# Patient Record
Sex: MALE | Race: BLACK OR AFRICAN AMERICAN | NOT HISPANIC OR LATINO | Employment: OTHER | URBAN - METROPOLITAN AREA
[De-identification: names, ages, dates, MRNs, and addresses within clinical notes are randomized per-mention and may not be internally consistent; named-entity substitution may affect disease eponyms.]

---

## 2019-12-05 RX ORDER — MULTIVIT WITH MINERALS/LUTEIN
250 TABLET ORAL DAILY
COMMUNITY

## 2019-12-05 RX ORDER — EZETIMIBE 10 MG/1
TABLET ORAL
COMMUNITY
Start: 2019-09-09

## 2019-12-05 RX ORDER — LATANOPROST 50 UG/ML
SOLUTION/ DROPS OPHTHALMIC
COMMUNITY
Start: 2018-07-11

## 2019-12-05 RX ORDER — VALSARTAN AND HYDROCHLOROTHIAZIDE 320; 25 MG/1; MG/1
TABLET, FILM COATED ORAL
COMMUNITY
Start: 2019-09-04

## 2019-12-05 RX ORDER — ASPIRIN 81 MG/1
81 TABLET, CHEWABLE ORAL DAILY
COMMUNITY

## 2019-12-05 RX ORDER — PROPRANOLOL HYDROCHLORIDE 40 MG/1
TABLET ORAL
COMMUNITY
Start: 2019-01-14

## 2019-12-05 RX ORDER — HYDRALAZINE HYDROCHLORIDE 100 MG/1
TABLET, FILM COATED ORAL
COMMUNITY
Start: 2019-06-10

## 2019-12-05 RX ORDER — INSULIN LISPRO 100 [IU]/ML
INJECTION, SOLUTION INTRAVENOUS; SUBCUTANEOUS
COMMUNITY
Start: 2019-07-15

## 2019-12-05 NOTE — PROGRESS NOTES
Subjective   Patient ID: Lemuel Borrego is a 74 y.o. male is here today as a self referral for 2nd opinion for intermittent mild to moderate neck and bilateral shoulder and arm pain.  He is also having N/T right hand/fingers and bilateral arm weakness. He denies problems with his balance and gait and no urinary incontinence.    e has had recent PT as ordered by his PCP that helped minimally.  He is following HEP.  He is in pain management with Dr Craft with [facility] and has had injections, but he does not have know what they were and they did not help.  He has a follow up appt soon.  He states that he was prescribed meds for pain,however, he cannot recall the name of the medication, but he seldom takes it.     Patient was seen by Dr Eddie Clemente 7.25.19 who recommended surgery - C2/3 laminectomy, C4, C5-6 laminoplasty with plating, however, patient wanted a 2nd opinion prior to proceeding.    The patient is here for my opinion. He has seen Dr. Clemente downtown. He has been cared for by Dr. Craft at [facility]. He is a retired . About a year ago he began having some neck pain and some bilateral interscapular pain and right radiating shoulder and arm pain. That has progressed. He has also had numbness in his thumb and balance problems. He is a diabetic and takes insulin. When I examine him he does not have any outright hyperreflexia or pathological reflexes, and his strength actually is fairly decent. We discussed his MRI which does show severe stenosis and myelomalacia. He is not overtly myelopathic. He is clearly radiculopathic. Blocks have been tried as has physical therapy. When he went to see Dr. Clemente he recommended doing a laminoplasty which I told the patient is not an unreasonable recommendation. What seems to bother him the most is actually the pain, particularly in the shoulder and arm on the right. I told him I thought it would be worthwhile to undergo a  trial of gabapentin to see if that helps his pain and if it does not then we can consider a laminoplasty. Again, I think he is mostly radiculopathic although the myelomalacia is concerning. Surgery itself would be for his radiculopathy and prevention of any future myelopathy. If it had to be done I would do it from C2 to C6 via a right-sided approach, but he is wiling to try gabapentin first and so will try it at 300 mg twice a day and have him come back in 2 months.       Neck Pain    The current episode started more than 1 month ago. The problem occurs constantly. The pain is present in the midline. The pain is at a severity of 5/10. The pain is moderate. Associated symptoms include numbness and weakness.   Arm Pain    The pain is present in the left shoulder, upper right arm, upper left arm, right shoulder, left forearm and right forearm. Associated symptoms include numbness.   Extremity Weakness    The pain is present in the left arm and right arm. The problem occurs intermittently. The pain is at a severity of 6/10. The pain is moderate. Associated symptoms include numbness.       The following portions of the patient's history were reviewed and updated as appropriate: allergies, current medications, past family history, past medical history, past social history, past surgical history and problem list.    Review of Systems   Musculoskeletal: Positive for extremity weakness, neck pain and neck stiffness. Negative for arthralgias, gait problem and myalgias.   Neurological: Positive for weakness and numbness.   All other systems reviewed and are negative.      Objective   Physical Exam   Constitutional: He is oriented to person, place, and time. He appears well-developed and well-nourished.   HENT:   Head: Normocephalic and atraumatic.   Eyes: Pupils are equal, round, and reactive to light. Conjunctivae and EOM are normal.   Fundoscopic exam:       The right eye shows no papilledema. The right eye shows venous  pulsations.        The left eye shows no papilledema. The left eye shows venous pulsations.   Neck: Carotid bruit is not present.   Neurological: He is oriented to person, place, and time. He has a normal Finger-Nose-Finger Test and a normal Heel to Shin Test. Gait normal.   Reflex Scores:       Tricep reflexes are 2+ on the right side and 2+ on the left side.       Bicep reflexes are 2+ on the right side and 2+ on the left side.       Brachioradialis reflexes are 2+ on the right side and 2+ on the left side.       Patellar reflexes are 2+ on the right side and 2+ on the left side.       Achilles reflexes are 2+ on the right side and 2+ on the left side.  Psychiatric: His speech is normal.     Neurologic Exam     Mental Status   Oriented to person, place, and time.   Registration of memory: Good recent and remote memory.   Attention: normal. Concentration: normal.   Speech: speech is normal   Level of consciousness: alert  Knowledge: consistent with education.     Cranial Nerves     CN II   Visual fields full to confrontation.   Visual acuity: normal    CN III, IV, VI   Pupils are equal, round, and reactive to light.  Extraocular motions are normal.     CN V   Facial sensation intact.   Right corneal reflex: normal  Left corneal reflex: normal    CN VII   Facial expression full, symmetric.   Right facial weakness: none  Left facial weakness: none    CN VIII   Hearing: intact    CN IX, X   Palate: symmetric    CN XI   Right sternocleidomastoid strength: normal  Left sternocleidomastoid strength: normal    CN XII   Tongue: not atrophic  Tongue deviation: none    Motor Exam   Muscle bulk: normal  Right arm tone: normal  Left arm tone: normal  Right leg tone: normal  Left leg tone: normal    Strength   Strength 5/5 except as noted.     Sensory Exam   Light touch normal.     Gait, Coordination, and Reflexes     Gait  Gait: normal    Coordination   Finger to nose coordination: normal  Heel to shin coordination:  normal    Reflexes   Right brachioradialis: 2+  Left brachioradialis: 2+  Right biceps: 2+  Left biceps: 2+  Right triceps: 2+  Left triceps: 2+  Right patellar: 2+  Left patellar: 2+  Right achilles: 2+  Left achilles: 2+  Right : 2+  Left : 2+      Assessment/Plan   Independent Review of Radiographic Studies:    I reviewed his cervical MRI done on 6/7/2019 which shows cervical stenosis from C2-C6 due to both facet disease and disc protrusions with some myelomalacia at C2-C3 C3-C4 and C4-C5.  Agree with the report.      Medical Decision Making:    It may come to surgery but I suggested that we try some gabapentin first at 300 mg twice daily.  While that will not help his balance or the numbness in his thumb and might help his pain in his arm and perhaps even the neck.  We will have him come back in 2 months.  If he is not much better than we can consider doing a laminoplasty from C2-C6 using a right-sided approach, the side of his most significant radicular pain.      Lemuel was seen today for neck pain, shoulder pain, arm pain, extremity weakness and numbness.    Diagnoses and all orders for this visit:    Cervical spinal stenosis  -     gabapentin (NEURONTIN) 300 MG capsule; Take 1 capsule po qhs x 1 week, then bid    Chronic neck pain  -     gabapentin (NEURONTIN) 300 MG capsule; Take 1 capsule po qhs x 1 week, then bid    Cervical radiculopathy  -     gabapentin (NEURONTIN) 300 MG capsule; Take 1 capsule po qhs x 1 week, then bid      Return in about 2 months (around 2/11/2020).

## 2019-12-11 ENCOUNTER — OFFICE VISIT (OUTPATIENT)
Dept: NEUROSURGERY | Facility: CLINIC | Age: 74
End: 2019-12-11

## 2019-12-11 VITALS
SYSTOLIC BLOOD PRESSURE: 138 MMHG | HEIGHT: 69 IN | DIASTOLIC BLOOD PRESSURE: 78 MMHG | WEIGHT: 214 LBS | HEART RATE: 74 BPM | BODY MASS INDEX: 31.7 KG/M2

## 2019-12-11 DIAGNOSIS — G89.29 CHRONIC NECK PAIN: ICD-10-CM

## 2019-12-11 DIAGNOSIS — M54.2 CHRONIC NECK PAIN: ICD-10-CM

## 2019-12-11 DIAGNOSIS — M48.02 CERVICAL SPINAL STENOSIS: Primary | ICD-10-CM

## 2019-12-11 DIAGNOSIS — M54.12 CERVICAL RADICULOPATHY: ICD-10-CM

## 2019-12-11 PROCEDURE — 99204 OFFICE O/P NEW MOD 45 MIN: CPT | Performed by: NEUROLOGICAL SURGERY

## 2019-12-11 RX ORDER — GABAPENTIN 300 MG/1
CAPSULE ORAL
Qty: 60 CAPSULE | Refills: 3 | Status: SHIPPED | OUTPATIENT
Start: 2019-12-11 | End: 2020-04-15

## 2019-12-12 ENCOUNTER — TELEPHONE (OUTPATIENT)
Dept: NEUROSURGERY | Facility: CLINIC | Age: 74
End: 2019-12-12

## 2019-12-12 NOTE — TELEPHONE ENCOUNTER
Spoke with patient's insurance company, [place] approved- case # [id number] , good from 12.11.19 to 12.20.2020    LM for patient letting him know and per insurance rep, his pharmacy informed electronically that it has been approved

## 2020-03-04 ENCOUNTER — OFFICE VISIT (OUTPATIENT)
Dept: NEUROSURGERY | Facility: CLINIC | Age: 75
End: 2020-03-04

## 2020-03-04 VITALS
HEIGHT: 69 IN | SYSTOLIC BLOOD PRESSURE: 134 MMHG | HEART RATE: 74 BPM | DIASTOLIC BLOOD PRESSURE: 79 MMHG | BODY MASS INDEX: 31.59 KG/M2

## 2020-03-04 DIAGNOSIS — M48.02 CERVICAL SPINAL STENOSIS: Primary | ICD-10-CM

## 2020-03-04 DIAGNOSIS — M54.12 CERVICAL RADICULOPATHY: ICD-10-CM

## 2020-03-04 DIAGNOSIS — M54.2 CHRONIC NECK PAIN: ICD-10-CM

## 2020-03-04 DIAGNOSIS — G89.29 CHRONIC NECK PAIN: ICD-10-CM

## 2020-03-04 PROCEDURE — 99214 OFFICE O/P EST MOD 30 MIN: CPT | Performed by: NEUROLOGICAL SURGERY

## 2020-04-14 DIAGNOSIS — M48.02 CERVICAL SPINAL STENOSIS: ICD-10-CM

## 2020-04-14 DIAGNOSIS — G89.29 CHRONIC NECK PAIN: ICD-10-CM

## 2020-04-14 DIAGNOSIS — M54.12 CERVICAL RADICULOPATHY: ICD-10-CM

## 2020-04-14 DIAGNOSIS — M54.2 CHRONIC NECK PAIN: ICD-10-CM

## 2020-04-15 RX ORDER — GABAPENTIN 300 MG/1
CAPSULE ORAL
Qty: 60 CAPSULE | Refills: 3 | Status: SHIPPED | OUTPATIENT
Start: 2020-04-15 | End: 2020-08-24

## 2020-07-02 ENCOUNTER — TELEPHONE (OUTPATIENT)
Dept: NEUROSURGERY | Facility: CLINIC | Age: 75
End: 2020-07-02

## 2020-07-24 ENCOUNTER — TELEPHONE (OUTPATIENT)
Dept: NEUROSURGERY | Facility: CLINIC | Age: 75
End: 2020-07-24

## 2020-07-24 NOTE — TELEPHONE ENCOUNTER
PATIENT CALLED TO SCHEDULE A TELEPHONE VISIT TO DISCUSS HIS SHOULDER.  HE HAS HAD 4 SHOTS AND IS DOING WELL BUT WOULD LIKE TO KNOW WHAT IS GOING ON WITH HIS SHOULDER.  PLEASE CALL AND ADVISE.  IF PATIENT DOES NOT ANSWER PLEASE A MESSAGE AS TO THE DATE/TIME.  PLEASE CALL BOTH PHONES AND LEAVE A MESSAGE.    [phone] HOME  [phone] CELL

## 2020-07-28 NOTE — TELEPHONE ENCOUNTER
He was made an appt for Friday in error, is this something that you would see him for or just leave him on the schedule

## 2020-07-30 NOTE — PROGRESS NOTES
Subjective   History of Present Illness: Lemuel Borrego is a 74 y.o. male for follow up for bilateral  shoulder pain and intermittent neck and bilateral arm pain, bilateral arm weakness and right hand N/T.  He denies urinary incontinence or problems with his balance and gait.  He states that he has not seen an orthopedic surgeon for his shoulder pain.  He is taking Gabapentin 300 mg BID and he taking Norco 5/325 prn as prescribed by pain management    Patient states his PCP sent him to PT for his shoulder pain and the physical therapist recommended he follow up here    He is seeing Dr Craft for pain management and had an injection earlier this month that has helped some.  He is unsure of the type of injection    Patient was last seen 3.4.20 for neck and arm pain and weakness    I have been following this patient for cervical stenosis.  He has some neck pain and intermittent arm pain, but it is manageable.  His pain physician, Dr. Craft, gives him injections and it does seem to help.  He went to the physical therapist who told him that he may have a shoulder problem.  I agree with that.  He had some significant pain to palpation of the left shoulder and to external rotation, uncharacteristic of cervical problems.  He does not recall ever seeing an orthopedic surgeon, so we will send him to the orthopedic doctors along with an x-ray.  I will see him in 6 months just to follow him.  If he shows some signs of myelopathy then he might need to have surgery, but we are not at that point right now.      Neck Pain    The problem occurs constantly. The pain is associated with an MVA. The pain is at a severity of 4/10. The pain is moderate. Associated symptoms include numbness and weakness. Pertinent negatives include no fever.   Arm Pain    The pain is present in the left shoulder, right shoulder, upper right arm, upper left arm and left forearm. The pain is at a severity of 5/10. The pain is moderate. Associated  "symptoms include numbness.   Extremity Weakness    The pain is present in the left arm and right arm. The problem occurs constantly. Associated symptoms include numbness. Pertinent negatives include no fever.       The following portions of the patient's history were reviewed and updated as appropriate: allergies, current medications, past family history, past medical history, past social history, past surgical history and problem list.    Review of Systems   Constitutional: Negative for fever.   HENT: Negative.    Eyes: Negative.    Respiratory: Negative.    Cardiovascular: Negative.    Gastrointestinal: Negative.    Endocrine: Negative.    Genitourinary: Negative for urgency.   Musculoskeletal: Positive for extremity weakness, neck pain and neck stiffness. Negative for arthralgias, gait problem, joint swelling and myalgias.   Allergic/Immunologic: Negative.    Neurological: Positive for weakness and numbness.   Hematological: Negative.    Psychiatric/Behavioral: Negative.        Objective     Vitals:    07/31/20 1300   BP: 158/74   Pulse: 74   Temp: 98.2 °F (36.8 °C)   TempSrc: Tympanic   Height: 175.3 cm (69.02\")     Body mass index is 31.59 kg/m².      Physical Exam   Constitutional: He is oriented to person, place, and time. He appears well-developed and well-nourished.   HENT:   Head: Normocephalic and atraumatic.   Eyes: Pupils are equal, round, and reactive to light. Conjunctivae and EOM are normal.   Fundoscopic exam:       The right eye shows no papilledema. The right eye shows venous pulsations.        The left eye shows no papilledema. The left eye shows venous pulsations.   Neck: Carotid bruit is not present.   Neurological: He is oriented to person, place, and time. He has a normal Finger-Nose-Finger Test and a normal Heel to Shin Test. Gait normal.   Reflex Scores:       Tricep reflexes are 2+ on the right side and 2+ on the left side.       Bicep reflexes are 2+ on the right side and 2+ on the left " side.       Brachioradialis reflexes are 2+ on the right side and 2+ on the left side.       Patellar reflexes are 2+ on the right side and 2+ on the left side.       Achilles reflexes are 2+ on the right side and 2+ on the left side.  Psychiatric: His speech is normal.     Neurologic Exam     Mental Status   Oriented to person, place, and time.   Registration of memory: Good recent and remote memory.   Attention: normal. Concentration: normal.   Speech: speech is normal   Level of consciousness: alert  Knowledge: consistent with education.     Cranial Nerves     CN II   Visual fields full to confrontation.   Visual acuity: normal    CN III, IV, VI   Pupils are equal, round, and reactive to light.  Extraocular motions are normal.     CN V   Facial sensation intact.   Right corneal reflex: normal  Left corneal reflex: normal    CN VII   Facial expression full, symmetric.   Right facial weakness: none  Left facial weakness: none    CN VIII   Hearing: intact    CN IX, X   Palate: symmetric    CN XI   Right sternocleidomastoid strength: normal  Left sternocleidomastoid strength: normal    CN XII   Tongue: not atrophic  Tongue deviation: none    Motor Exam   Muscle bulk: normal  Right arm tone: normal  Left arm tone: normal  Right leg tone: normal  Left leg tone: normal    Strength   Strength 5/5 except as noted.     Sensory Exam   Light touch normal.     Gait, Coordination, and Reflexes     Gait  Gait: normal    Coordination   Finger to nose coordination: normal  Heel to shin coordination: normal    Reflexes   Right brachioradialis: 2+  Left brachioradialis: 2+  Right biceps: 2+  Left biceps: 2+  Right triceps: 2+  Left triceps: 2+  Right patellar: 2+  Left patellar: 2+  Right achilles: 2+  Left achilles: 2+  Right : 2+  Left : 2+          Assessment/Plan   Independent Review of Radiographic Studies:      I personally reviewed the images from the following studies.    I reviewed his cervical MRI done on 6/7/2019  which shows cervical stenosis from C2-C6 due to both facet disease and disc protrusions with some myelomalacia at C2-C3 C3-C4 and C4-C5.  Agree with the report.    Medical Decision Making:      His main area of pain is the left shoulder.  We can just watch him still for the cervical stenosis.  We will send him to orthopedics after getting an x-ray to have them evaluate him.  We will see him in 6-month so I can watch his gait.      Lemuel was seen today for shoulder pain, neck pain, arm pain, extremity weakness and numbness.    Diagnoses and all orders for this visit:    Cervical spinal stenosis    Chronic neck pain    Chronic pain in left shoulder  -     XR shoulder 2+ vw left; Future  -     Ambulatory Referral to Orthopedic Surgery      Return in about 6 months (around 1/31/2021) for Face-to-face.

## 2020-07-31 ENCOUNTER — OFFICE VISIT (OUTPATIENT)
Dept: NEUROSURGERY | Facility: CLINIC | Age: 75
End: 2020-07-31

## 2020-07-31 VITALS
HEIGHT: 69 IN | TEMPERATURE: 98.2 F | HEART RATE: 74 BPM | SYSTOLIC BLOOD PRESSURE: 158 MMHG | BODY MASS INDEX: 31.59 KG/M2 | DIASTOLIC BLOOD PRESSURE: 74 MMHG

## 2020-07-31 DIAGNOSIS — G89.29 CHRONIC NECK PAIN: ICD-10-CM

## 2020-07-31 DIAGNOSIS — M25.512 CHRONIC PAIN IN LEFT SHOULDER: ICD-10-CM

## 2020-07-31 DIAGNOSIS — M54.2 CHRONIC NECK PAIN: ICD-10-CM

## 2020-07-31 DIAGNOSIS — M48.02 CERVICAL SPINAL STENOSIS: Primary | ICD-10-CM

## 2020-07-31 DIAGNOSIS — G89.29 CHRONIC PAIN IN LEFT SHOULDER: ICD-10-CM

## 2020-07-31 PROCEDURE — 99214 OFFICE O/P EST MOD 30 MIN: CPT | Performed by: NEUROLOGICAL SURGERY

## 2020-07-31 RX ORDER — HYDROCODONE BITARTRATE AND ACETAMINOPHEN 5; 325 MG/1; MG/1
1 TABLET ORAL EVERY 6 HOURS PRN
COMMUNITY

## 2020-08-17 ENCOUNTER — OFFICE VISIT (OUTPATIENT)
Dept: ORTHOPEDIC SURGERY | Facility: CLINIC | Age: 75
End: 2020-08-17

## 2020-08-17 VITALS — BODY MASS INDEX: 31.1 KG/M2 | WEIGHT: 210 LBS | HEIGHT: 69 IN | TEMPERATURE: 98.2 F

## 2020-08-17 DIAGNOSIS — M25.512 LEFT SHOULDER PAIN, UNSPECIFIED CHRONICITY: Primary | ICD-10-CM

## 2020-08-17 PROCEDURE — 99204 OFFICE O/P NEW MOD 45 MIN: CPT | Performed by: ORTHOPAEDIC SURGERY

## 2020-08-17 PROCEDURE — 20610 DRAIN/INJ JOINT/BURSA W/O US: CPT | Performed by: ORTHOPAEDIC SURGERY

## 2020-08-17 PROCEDURE — 73030 X-RAY EXAM OF SHOULDER: CPT | Performed by: ORTHOPAEDIC SURGERY

## 2020-08-17 RX ORDER — METHYLPREDNISOLONE ACETATE 80 MG/ML
80 INJECTION, SUSPENSION INTRA-ARTICULAR; INTRALESIONAL; INTRAMUSCULAR; SOFT TISSUE
Status: COMPLETED | OUTPATIENT
Start: 2020-08-17 | End: 2020-08-17

## 2020-08-17 RX ADMIN — METHYLPREDNISOLONE ACETATE 80 MG: 80 INJECTION, SUSPENSION INTRA-ARTICULAR; INTRALESIONAL; INTRAMUSCULAR; SOFT TISSUE at 10:00

## 2020-08-17 NOTE — PROGRESS NOTES
Patient: Lemuel Borrego    YOB: 1945    Medical Record Number: [id number]    Chief Complaints:  Left shoulder pain    History of Present Illness:     75 y.o. male patient who presents with a complaint of left shoulder pain and weakness.  He reports that the symptoms first started several years ago but seem to have gotten particularly worse over the past few months.  Pain is moderate to severe, constant and aching.  He gets intermittent stabbing pains when trying to reach and lift overhead.  The pain is worse with certain reaching and lifting movements as well as at night.  He denies any alleviating factors.  He did try some therapy which did not seem to help.  He denies any shooting pain down the arm, distal weakness, numbness or paresthesias.    Allergies: No Known Allergies    Home Medications:    Current Outpatient Medications:   •  aspirin 81 MG chewable tablet, Chew 81 mg Daily., Disp: , Rfl:   •  ezetimibe (ZETIA) 10 MG tablet, TAKE 1 TABLET BY MOUTH DAILY, Disp: , Rfl:   •  gabapentin (NEURONTIN) 300 MG capsule, TAKE 1 CAPSULE BY MOUTH TWICE A DAY, Disp: 60 capsule, Rfl: 3  •  hydrALAZINE (APRESOLINE) 100 MG tablet, TAKE 1 TABLET BY MOUTH THREE TIMES DAILY, Disp: , Rfl:   •  HYDROcodone-acetaminophen (NORCO) 5-325 MG per tablet, Take 1 tablet by mouth Every 6 (Six) Hours As Needed., Disp: , Rfl:   •  Insulin Glargine (LANTUS SOLOSTAR) 100 UNIT/ML injection pen, INJECT 12 UNITS UNDER THE SKIN DAILY, Disp: , Rfl:   •  Insulin Lispro, 1 Unit Dial, (HUMALOG KWIKPEN) 100 UNIT/ML solution pen-injector, INJECT 6 UNITS WITH LUNCH AND 6 UNITS WITH DINNER., Disp: , Rfl:   •  Insulin Pen Needle (B-D UF III MINI PEN NEEDLES) 31G X 5 MM misc, INJECT 1 PEN NEEDLE FOR INJECTIONS FOUR TIMES A DAY, Disp: , Rfl:   •  latanoprost (XALATAN) 0.005 % ophthalmic solution, INSTILL ONE GTT IN OU QHS, Disp: , Rfl:   •  metFORMIN (GLUCOPHAGE) 850 MG tablet, TAKE 1 TABLET BY MOUTH TWO TIMES A DAY WITH MEALS, Disp: , Rfl:    •  propranolol (INDERAL) 40 MG tablet, TAKE 1 TABLET BY MOUTH THREE TIMES DAILY, Disp: , Rfl:   •  valsartan-hydrochlorothiazide (DIOVAN-HCT) 320-25 MG per tablet, TAKE 1 TABLET BY MOUTH DAILY, Disp: , Rfl:   •  vitamin C (ASCORBIC ACID) 250 MG tablet, Take 250 mg by mouth Daily., Disp: , Rfl:     Past Medical History:   Diagnosis Date   • Diabetes (CMS/HCC)    • Hypertelorism        No past surgical history on file.    Social History     Occupational History   • Not on file   Tobacco Use   • Smoking status: Never Smoker   • Smokeless tobacco: Never Used   Substance and Sexual Activity   • Alcohol use: Yes     Frequency: Monthly or less   • Drug use: No   • Sexual activity: Not on file      Social History     Social History Narrative   • Not on file       Family History   Problem Relation Age of Onset   • Diabetes Mother    • Heart disease Mother    • Heart disease Father    • Diabetes Sister    • Diabetes Brother        Review of Systems:      Constitutional: Denies fever, shaking or chills   Eyes: Denies change in visual acuity   HEENT: Denies nasal congestion or sore throat   Respiratory: Denies cough or shortness of breath   Cardiovascular: Denies chest pain or edema  Endocrine: Denies tremors, palpitations, intolerance of heat or cold, polyuria, polydipsia.  GI: Denies abdominal pain, nausea, vomiting, bloody stools or diarrhea  : Denies frequency, urgency, incontinence, retention, or nocturia.  Musculoskeletal: Denies numbness, tingling or loss of motor function except as above  Integument: Denies rash, lesion or ulceration   Neurologic: Denies headache or focal weakness, deficits  Heme: Denies spontaneous or excessive bleeding, epistaxis, hematuria, melena, fatigue, enlarged or tender lymph nodes.      All other pertinent positives and negatives as noted above in HPI.    Physical Exam:   75 y.o. male    Vitals:    08/17/20 0945   Temp: 98.2 °F (36.8 °C)   TempSrc: Oral   Weight: 95.3 kg (210 lb)   Height:  "175.3 cm (69\")     General:  Patient is awake and alert.  Appears in no acute distress or discomfort.    Psych:  Affect and demeanor are appropriate.    Eyes:  Conjunctiva and sclera appear grossly normal.  Eyes track well and EOM seem to be intact.    Ears:  No gross abnormalities.  Hearing adequate for the exam.    Cardiovascular:  Regular rate and rhythm.    Lungs:  Good chest expansion.  Breathing unlabored.    Lymph:  No palpable adenopathy about neck or axilla.    Neck:  Supple.  Somewhat limited ROM.  Negative Spurling's for shoulder or arm pain but does cause neck pain    Left upper extremity:  Skin is benign.  No gross abnormalities on inspection including any atrophy, swellings, or masses.  No palpable masses or adenopathy.  Mild to moderate anterior tenderness without effusion.  Full shoulder motion but he struggles with mid arc elevation.  No evident instability or apprehension.  Mildly positive Neer, [place]ona.  Weakness with forward elevation in the scapular plane.  Good strength with internal and external rotation.  Good strength in the deltoid, biceps, triceps, and .  Intact sensation throughout the arm.  Brisk cap refill.  Palpable radial pulse.  Good skin turgor.         Radiology:   AP, scapular Y, and axillary views of the left shoulder are ordered by myself and reviewed to evaluate the patient's complaint.  No comparison films are immediately available.  The x-rays show calcification of the coracoacromial ligament and a large type II acromion.  There are no obvious acute abnormalities, lesions, masses, significant degenerative changes, or other concerning findings.  The acromiohumeral interval is normal.  Glenoid version appears normal as well.    Assessment/Plan:   Left rotator cuff tear    I suspect he probably has a tear.  We discussed the natural history of rotator cuff tears and risk of potential tear progression.  We thoroughly discussed options in detail including a trial of " conservative treatment versus further work-up and potential surgical options. He has acknowledged understanding of this information.  The patient would prefer to pursue nonsurgical management at this time.  The risk, benefits and alternatives to an injection were thoroughly discussed, particularly with respect to his diabetes.  He consented and the injection was performed as described below.  He was not interested in going back to PT.  He will follow-up with me as needed going forward.  I told him to call me in 2 weeks if no better.    Bubba Woodard MD    08/17/2020    CC to Andre Rojas MD    Large Joint Arthrocentesis: L subacromial bursa  Date/Time: 8/17/2020 10:00 AM  Consent given by: patient  Site marked: site marked  Timeout: Immediately prior to procedure a time out was called to verify the correct patient, procedure, equipment, support staff and site/side marked as required   Supporting Documentation  Indications: pain and joint swelling   Procedure Details  Location: shoulder - L subacromial bursa  Preparation: Patient was prepped and draped in the usual sterile fashion  Needle gauge: 21g.  Approach: posterior  Medications administered: 80 mg methylPREDNISolone acetate 80 MG/ML; 2 mL lidocaine (cardiac)  Patient tolerance: patient tolerated the procedure well with no immediate complications

## 2020-08-21 DIAGNOSIS — M54.12 CERVICAL RADICULOPATHY: ICD-10-CM

## 2020-08-21 DIAGNOSIS — G89.29 CHRONIC NECK PAIN: ICD-10-CM

## 2020-08-21 DIAGNOSIS — M48.02 CERVICAL SPINAL STENOSIS: ICD-10-CM

## 2020-08-21 DIAGNOSIS — M54.2 CHRONIC NECK PAIN: ICD-10-CM

## 2020-08-24 RX ORDER — GABAPENTIN 300 MG/1
CAPSULE ORAL
Qty: 60 CAPSULE | Refills: 5 | Status: SHIPPED | OUTPATIENT
Start: 2020-08-24 | End: 2021-03-02

## 2021-01-28 ENCOUNTER — TELEPHONE (OUTPATIENT)
Dept: NEUROSURGERY | Facility: CLINIC | Age: 76
End: 2021-01-28

## 2021-01-28 NOTE — TELEPHONE ENCOUNTER
Called patient to verify x-ray had been performed prior to 2.1.21 appointment. Patient informed me he had not had an x-ray done. He stated that he had surgery on Tuesday, a lump removed from his back. He couldn't remember the physician that performed the surgery. I asked him about his left shoulder and he stated that his right shoulder was the painful shoulder. He had gotten an injection in his left shoulder and now his right shoulder was bothersome.   I discussed the x-ray with JANELL Castro and she approved of ordering a right shoulder x-ray.

## 2021-02-01 ENCOUNTER — HOSPITAL ENCOUNTER (OUTPATIENT)
Dept: GENERAL RADIOLOGY | Facility: HOSPITAL | Age: 76
Discharge: HOME | End: 2021-02-01
Admitting: NEUROLOGICAL SURGERY

## 2021-02-01 DIAGNOSIS — M25.511 PAIN IN JOINT OF RIGHT SHOULDER: ICD-10-CM

## 2021-02-01 PROCEDURE — 73030 X-RAY EXAM OF SHOULDER: CPT

## 2021-02-09 NOTE — PROGRESS NOTES
"Subjective   Patient ID: Lemuel Borrego is a 75 y.o. male is here today for follow-up. Patient was last seen in the office on 7/31/2020 with Dr. Odilon Beavers MD for neck, shoulder, and arm pain with extremity weakness and numbness. Patient was referred to orthopedic surgery and XR was ordered.     Patient had XR on 2/1/21.     Today, the patient reports he has neck, shoulder and arm pain with numbness and weakness. He has had some extremity swelling.    Patient, provider and MA are all wearing a mask in our office today.    I have been following this very nice man for radiographic cervical stenosis. He has come chronic neck pain and some numbness and tingling in his hands, but his balance and his strength actually are quite good. I did not feel there was a reason to operate on him last time. His gait still is stable. There has been no falling. He has a part-time business in lawn care. He used to work for [employer] for 30 years. I do not think there is any substantial clinical deterioration. I asked him to come and see me in 1 year.       History of Present Illness    The following portions of the patient's history were reviewed and updated as appropriate: allergies, current medications, past family history, past medical history, past social history, past surgical history and problem list.    Review of Systems   Constitutional: Negative for fever.   Respiratory: Negative for chest tightness and shortness of breath.    Cardiovascular: Positive for leg swelling.   Musculoskeletal: Positive for arthralgias, myalgias and neck pain.   Neurological: Positive for weakness and numbness.   All other systems reviewed and are negative.          Objective     Vitals:    02/22/21 1243   BP: 128/76   Pulse: 74   Temp: 97.5 °F (36.4 °C)   Weight: 95.3 kg (210 lb)   Height: 175.3 cm (69\")     Body mass index is 31.01 kg/m².      Physical Exam  Constitutional:       Appearance: He is well-developed.   HENT:      Head: Normocephalic " and atraumatic.   Eyes:      Extraocular Movements: EOM normal.      Conjunctiva/sclera: Conjunctivae normal.      Pupils: Pupils are equal, round, and reactive to light.   Neck:      Vascular: No carotid bruit.   Neurological:      Mental Status: He is oriented to person, place, and time.      Coordination: Finger-Nose-Finger Test and Heel to Shin Test normal.      Gait: Gait is intact.      Deep Tendon Reflexes:      Reflex Scores:       Tricep reflexes are 2+ on the right side and 2+ on the left side.       Bicep reflexes are 2+ on the right side and 2+ on the left side.       Brachioradialis reflexes are 2+ on the right side and 2+ on the left side.       Patellar reflexes are 2+ on the right side and 2+ on the left side.       Achilles reflexes are 2+ on the right side and 2+ on the left side.  Psychiatric:         Speech: Speech normal.       Neurologic Exam     Mental Status   Oriented to person, place, and time.   Registration of memory: Good recent and remote memory.   Attention: normal. Concentration: normal.   Speech: speech is normal   Level of consciousness: alert  Knowledge: consistent with education.     Cranial Nerves     CN II   Visual fields full to confrontation.   Visual acuity: normal    CN III, IV, VI   Pupils are equal, round, and reactive to light.  Extraocular motions are normal.     CN V   Facial sensation intact.   Right corneal reflex: normal  Left corneal reflex: normal    CN VII   Facial expression full, symmetric.   Right facial weakness: none  Left facial weakness: none    CN VIII   Hearing: intact    CN IX, X   Palate: symmetric    CN XI   Right sternocleidomastoid strength: normal  Left sternocleidomastoid strength: normal    CN XII   Tongue: not atrophic  Tongue deviation: none    Motor Exam   Muscle bulk: normal  Right arm tone: normal  Left arm tone: normal  Right leg tone: normal  Left leg tone: normal    Strength   Strength 5/5 except as noted.     Sensory Exam   Light touch  normal.     Gait, Coordination, and Reflexes     Gait  Gait: normal    Coordination   Finger to nose coordination: normal  Heel to shin coordination: normal    Reflexes   Right brachioradialis: 2+  Left brachioradialis: 2+  Right biceps: 2+  Left biceps: 2+  Right triceps: 2+  Left triceps: 2+  Right patellar: 2+  Left patellar: 2+  Right achilles: 2+  Left achilles: 2+  Right : 2+  Left : 2+          Assessment/Plan   Independent Review of Radiographic Studies:      I personally reviewed the images from the following studies.    I reviewed his cervical MRI done on 6/7/2019 which shows cervical stenosis from C2-C6 due to both facet disease and disc protrusions with some myelomalacia at C2-C3 C3-C4 and C4-C5.  Agree with the report.    Medical Decision Making:      Clinically stable at this point.  We will keep an eye on him but I asked him to come back and see me in 1 year.  If he has more pain in his neck or more weakness or balance problems he will let me know.      Diagnoses and all orders for this visit:    1. Cervical spinal stenosis (Primary)    2. Chronic neck pain      Return in about 1 year (around 2/22/2022) for Face-to-face.

## 2021-02-22 ENCOUNTER — OFFICE VISIT (OUTPATIENT)
Dept: NEUROSURGERY | Facility: CLINIC | Age: 76
End: 2021-02-22

## 2021-02-22 VITALS
DIASTOLIC BLOOD PRESSURE: 76 MMHG | SYSTOLIC BLOOD PRESSURE: 128 MMHG | TEMPERATURE: 97.5 F | HEIGHT: 69 IN | WEIGHT: 210 LBS | HEART RATE: 74 BPM | BODY MASS INDEX: 31.1 KG/M2

## 2021-02-22 DIAGNOSIS — M48.02 CERVICAL SPINAL STENOSIS: Primary | ICD-10-CM

## 2021-02-22 DIAGNOSIS — G89.29 CHRONIC NECK PAIN: ICD-10-CM

## 2021-02-22 DIAGNOSIS — M54.2 CHRONIC NECK PAIN: ICD-10-CM

## 2021-02-22 PROCEDURE — 99213 OFFICE O/P EST LOW 20 MIN: CPT | Performed by: NEUROLOGICAL SURGERY

## 2021-03-02 DIAGNOSIS — M54.2 CHRONIC NECK PAIN: ICD-10-CM

## 2021-03-02 DIAGNOSIS — G89.29 CHRONIC NECK PAIN: ICD-10-CM

## 2021-03-02 DIAGNOSIS — M48.02 CERVICAL SPINAL STENOSIS: ICD-10-CM

## 2021-03-02 DIAGNOSIS — M54.12 CERVICAL RADICULOPATHY: ICD-10-CM

## 2021-03-02 RX ORDER — GABAPENTIN 300 MG/1
CAPSULE ORAL
Qty: 60 CAPSULE | Refills: 5 | Status: SHIPPED | OUTPATIENT
Start: 2021-03-02 | End: 2021-10-06

## 2021-06-30 ENCOUNTER — OFFICE VISIT (OUTPATIENT)
Dept: ORTHOPEDIC SURGERY | Facility: CLINIC | Age: 76
End: 2021-06-30

## 2021-06-30 VITALS — WEIGHT: 210 LBS | HEIGHT: 69 IN | TEMPERATURE: 96.4 F | BODY MASS INDEX: 31.1 KG/M2

## 2021-06-30 DIAGNOSIS — M25.511 CHRONIC RIGHT SHOULDER PAIN: Primary | ICD-10-CM

## 2021-06-30 DIAGNOSIS — G89.29 CHRONIC RIGHT SHOULDER PAIN: Primary | ICD-10-CM

## 2021-06-30 PROCEDURE — 99214 OFFICE O/P EST MOD 30 MIN: CPT | Performed by: ORTHOPAEDIC SURGERY

## 2021-06-30 PROCEDURE — 20610 DRAIN/INJ JOINT/BURSA W/O US: CPT | Performed by: ORTHOPAEDIC SURGERY

## 2021-06-30 RX ORDER — METHYLPREDNISOLONE ACETATE 80 MG/ML
80 INJECTION, SUSPENSION INTRA-ARTICULAR; INTRALESIONAL; INTRAMUSCULAR; SOFT TISSUE
Status: COMPLETED | OUTPATIENT
Start: 2021-06-30 | End: 2021-06-30

## 2021-06-30 RX ORDER — LIDOCAINE HYDROCHLORIDE 20 MG/ML
2 INJECTION, SOLUTION EPIDURAL; INFILTRATION; INTRACAUDAL; PERINEURAL
Status: COMPLETED | OUTPATIENT
Start: 2021-06-30 | End: 2021-06-30

## 2021-06-30 RX ADMIN — METHYLPREDNISOLONE ACETATE 80 MG: 80 INJECTION, SUSPENSION INTRA-ARTICULAR; INTRALESIONAL; INTRAMUSCULAR; SOFT TISSUE at 11:48

## 2021-06-30 RX ADMIN — LIDOCAINE HYDROCHLORIDE 2 ML: 20 INJECTION, SOLUTION EPIDURAL; INFILTRATION; INTRACAUDAL; PERINEURAL at 11:48

## 2021-06-30 NOTE — PROGRESS NOTES
Patient:Lemuel Borrego    YOB: 1945    Medical Record Number:[id number]    Chief Complaints: Right shoulder pain    History of Present Illness:     75 y.o. male patient who presents for new complaint of right shoulder pain.  I saw him for the left shoulder about a year ago.  We did an injection and it helped tremendously.  He is now having similar symptoms on the right side.  This has been a gradually worsening issue for years, especially the last 6 months.  Denies any specific injury.  Pain is worse when reaching and lifting overhead.  He reports pain at night and difficulty sleeping.    Allergies:No Known Allergies    Home Medications:    Current Outpatient Medications:   •  aspirin 81 MG chewable tablet, Chew 81 mg Daily., Disp: , Rfl:   •  ezetimibe (ZETIA) 10 MG tablet, TAKE 1 TABLET BY MOUTH DAILY, Disp: , Rfl:   •  gabapentin (NEURONTIN) 300 MG capsule, TAKE 1 CAPSULE BY MOUTH TWICE DAILY, Disp: 60 capsule, Rfl: 5  •  hydrALAZINE (APRESOLINE) 100 MG tablet, TAKE 1 TABLET BY MOUTH THREE TIMES DAILY, Disp: , Rfl:   •  HYDROcodone-acetaminophen (NORCO) 5-325 MG per tablet, Take 1 tablet by mouth Every 6 (Six) Hours As Needed., Disp: , Rfl:   •  Insulin Glargine (LANTUS SOLOSTAR) 100 UNIT/ML injection pen, INJECT 12 UNITS UNDER THE SKIN DAILY, Disp: , Rfl:   •  Insulin Lispro, 1 Unit Dial, (HUMALOG KWIKPEN) 100 UNIT/ML solution pen-injector, INJECT 6 UNITS WITH LUNCH AND 6 UNITS WITH DINNER., Disp: , Rfl:   •  Insulin Pen Needle (B-D UF III MINI PEN NEEDLES) 31G X 5 MM misc, INJECT 1 PEN NEEDLE FOR INJECTIONS FOUR TIMES A DAY, Disp: , Rfl:   •  latanoprost (XALATAN) 0.005 % ophthalmic solution, INSTILL ONE GTT IN OU QHS, Disp: , Rfl:   •  metFORMIN (GLUCOPHAGE) 850 MG tablet, TAKE 1 TABLET BY MOUTH TWO TIMES A DAY WITH MEALS, Disp: , Rfl:   •  propranolol (INDERAL) 40 MG tablet, TAKE 1 TABLET BY MOUTH THREE TIMES DAILY, Disp: , Rfl:   •  valsartan-hydrochlorothiazide (DIOVAN-HCT) 320-25 MG per  "tablet, TAKE 1 TABLET BY MOUTH DAILY, Disp: , Rfl:   •  vitamin C (ASCORBIC ACID) 250 MG tablet, Take 250 mg by mouth Daily., Disp: , Rfl:     Past Medical History:   Diagnosis Date   • Diabetes (CMS/HCC)    • Hypertelorism        History reviewed. No pertinent surgical history.    Social History     Occupational History   • Not on file   Tobacco Use   • Smoking status: Never Smoker   • Smokeless tobacco: Never Used   Vaping Use   • Vaping Use: Never used   Substance and Sexual Activity   • Alcohol use: Yes   • Drug use: No   • Sexual activity: Defer      Social History     Social History Narrative   • Not on file       Family History   Problem Relation Age of Onset   • Diabetes Mother    • Heart disease Mother    • Heart disease Father    • Diabetes Sister    • Diabetes Brother        Review of Systems:      Constitutional: Denies fever, shaking or chills   Eyes: Denies change in visual acuity   HEENT: Denies nasal congestion or sore throat   Respiratory: Denies cough or shortness of breath   Cardiovascular: Denies chest pain or edema  Endocrine: Denies tremors, palpitations, intolerance of heat or cold, polyuria, polydipsia.  GI: Denies abdominal pain, nausea, vomiting, bloody stools or diarrhea  : Denies frequency, urgency, incontinence, retention, or nocturia.  Musculoskeletal: Denies numbness, tingling or loss of motor function except as above  Integument: Denies rash, lesion or ulceration   Neurologic: Denies headache or focal weakness, deficits  Heme: Denies spontaneous or excessive bleeding, epistaxis, hematuria, melena, fatigue, enlarged or tender lymph nodes.      All other pertinent positives and negatives as noted above in HPI.    Physical Exam:75 y.o. male  Vitals:    06/30/21 1124   Temp: 96.4 °F (35.8 °C)   Weight: 95.3 kg (210 lb)   Height: 175.3 cm (69.02\")       General:  Patient is awake and alert.  Appears in no acute distress or discomfort.    Psych:  Affect and demeanor are " appropriate.    Extremities: Right shoulder is examined.  Skin is benign.  Trace bursal effusion.  No increased warmth or erythema.  Full shoulder motion.  He has pain and weakness with elevation in the scapular plane and external rotation.    Imaging: Outside AP and rotated AP views of the right shoulder from January 2021 are reviewed along with the radiology report.  No acute abnormalities.  He has moderate acromioclavicular arthritis.  It looks like there may be a loose body or April calcification adjacent to the caudal aspect of the glenoid.  Acromiohumeral interval measures normal.    Assessment/Plan: Right shoulder pain and weakness, suspected rotator cuff tear    We discussed the natural history of this condition and his options.  He responded well to an injection on the left and he would like to try that for the right.  The risk, benefits and alternatives were discussed, particularly his elevated risk status due to medical comorbidities.  He acknowledged understanding of this information and consented to proceed.  The injection was performed as described below.    Bubba Wodoard MD    06/30/2021      Large Joint Arthrocentesis: R glenohumeral  Date/Time: 6/30/2021 11:48 AM  Consent given by: patient  Site marked: site marked  Timeout: Immediately prior to procedure a time out was called to verify the correct patient, procedure, equipment, support staff and site/side marked as required   Supporting Documentation  Indications: pain and joint swelling   Procedure Details  Location: shoulder - R glenohumeral  Preparation: Patient was prepped and draped in the usual sterile fashion  Needle gauge: 21g.  Approach: anterolateral  Medications administered: 80 mg methylPREDNISolone acetate 80 MG/ML; 2 mL lidocaine PF 2% 2 %  Patient tolerance: patient tolerated the procedure well with no immediate complications

## 2021-10-06 DIAGNOSIS — M54.2 CHRONIC NECK PAIN: ICD-10-CM

## 2021-10-06 DIAGNOSIS — G89.29 CHRONIC NECK PAIN: ICD-10-CM

## 2021-10-06 DIAGNOSIS — M54.12 CERVICAL RADICULOPATHY: ICD-10-CM

## 2021-10-06 DIAGNOSIS — M48.02 CERVICAL SPINAL STENOSIS: ICD-10-CM

## 2021-10-06 RX ORDER — GABAPENTIN 300 MG/1
CAPSULE ORAL
Qty: 60 CAPSULE | Refills: 5 | Status: SHIPPED | OUTPATIENT
Start: 2021-10-06 | End: 2022-05-02

## 2021-10-06 NOTE — TELEPHONE ENCOUNTER
Rx Refill Note  Requested Prescriptions     Pending Prescriptions Disp Refills   • gabapentin (NEURONTIN) 300 MG capsule [Pharmacy Med Name: GABAPENTIN 300MG CAPSULES] 60 capsule      Sig: TAKE 1 CAPSULE BY MOUTH TWICE DAILY      Last office visit with prescribing clinician: 2/22/2021      Next office visit with prescribing clinician: 2/21/2022            Marium Sneed MA  10/06/21, 16:35 EDT

## 2021-10-11 ENCOUNTER — CLINICAL SUPPORT (OUTPATIENT)
Dept: ORTHOPEDIC SURGERY | Facility: CLINIC | Age: 76
End: 2021-10-11

## 2021-10-11 VITALS — BODY MASS INDEX: 30.87 KG/M2 | TEMPERATURE: 97.4 F | HEIGHT: 69 IN | WEIGHT: 208.4 LBS

## 2021-10-11 DIAGNOSIS — M25.511 CHRONIC RIGHT SHOULDER PAIN: Primary | ICD-10-CM

## 2021-10-11 DIAGNOSIS — G89.29 CHRONIC RIGHT SHOULDER PAIN: Primary | ICD-10-CM

## 2021-10-11 PROCEDURE — 20610 DRAIN/INJ JOINT/BURSA W/O US: CPT | Performed by: ORTHOPAEDIC SURGERY

## 2021-10-11 RX ORDER — METHYLPREDNISOLONE ACETATE 80 MG/ML
80 INJECTION, SUSPENSION INTRA-ARTICULAR; INTRALESIONAL; INTRAMUSCULAR; SOFT TISSUE
Status: COMPLETED | OUTPATIENT
Start: 2021-10-11 | End: 2021-10-11

## 2021-10-11 RX ADMIN — METHYLPREDNISOLONE ACETATE 80 MG: 80 INJECTION, SUSPENSION INTRA-ARTICULAR; INTRALESIONAL; INTRAMUSCULAR; SOFT TISSUE at 10:33

## 2021-10-11 NOTE — PROGRESS NOTES
Mr. Borrego follows up today for the right shoulder.  He says that the injection we performed at his last visit did help.  Unfortunately, the effects were somewhat transient.  He is also now having neck problems for which he is scheduled to see Dr. Beavers in the near future.  We discussed possible further work-up of the shoulder.  He would prefer to try another injection today.  The risk, benefits and alternatives were discussed.  He consented and the injection was performed as described below.  He will follow-up as needed.      Large Joint Arthrocentesis: R glenohumeral  Date/Time: 10/11/2021 10:33 AM  Consent given by: patient  Site marked: site marked  Timeout: Immediately prior to procedure a time out was called to verify the correct patient, procedure, equipment, support staff and site/side marked as required   Supporting Documentation  Indications: pain   Procedure Details  Location: shoulder - R glenohumeral  Preparation: Patient was prepped and draped in the usual sterile fashion  Needle gauge: 21g.  Approach: posterior  Medications administered: 80 mg methylPREDNISolone acetate 80 MG/ML; 2 mL lidocaine (cardiac)  Patient tolerance: patient tolerated the procedure well with no immediate complications

## 2022-02-14 NOTE — PROGRESS NOTES
Subjective   Patient ID: Lemuel Borrego is a 76 y.o. male is here today for a 1Y cervical spinal stenosis follow-up.  Pt last seen on 2/22/21 and reports neck pain with weakness and numbness.    Today Mr. Borrego reports neck pain that is midline but radiates to the right side. He reports an increase in arm pain on his right side and states it has begun to move into his left arm. He reports numbness and tingling of his right hand, specifically at the thumb. He denies any sharp, shooting or stabbing pain that radiates down his arm. He continues to take Gabapentin 300 mg BID.     I have been following this patient for cervical stenosis from C3-C7.  Up until about 2 weeks ago we managed to control his symptoms with gabapentin at 300 mg twice daily but has had severe right-sided neck pain and right arm pain for about the last 2 weeks.  He cannot think of anything that set it off.  He is a diabetic.  I told him we could try a Medrol Dosepak and then get some new imaging.  We had a look at his neck since 2019.  Things may have changed or he may have a superimposed new process such as a herniated disc.  We can consider therapy if he is better or epidural blocks depending upon the findings when he returns.    I can detect no myelopathy today.  He does have good strength in his upper extremities but extension tends to reproduce some of his arm pain.        Neck Pain   This is a chronic problem. The current episode started more than 1 year ago. The problem occurs constantly. The problem has been gradually worsening. The pain is associated with nothing. The pain is present in the midline and right side. The quality of the pain is described as aching. The pain is at a severity of 5/10. The pain is mild. The symptoms are aggravated by bending, position and twisting. Associated symptoms include numbness and tingling. Pertinent negatives include no fever, leg pain or weakness. Treatments tried: gabapentin.       The following  portions of the patient's history were reviewed and updated as appropriate: allergies, current medications, past family history, past medical history, past social history, past surgical history and problem list.    Review of Systems   Constitutional: Negative for fever.   Musculoskeletal: Positive for neck pain.   Neurological: Positive for tingling and numbness. Negative for weakness.   Psychiatric/Behavioral: Positive for sleep disturbance.   All other systems reviewed and are negative.      Objective   Physical Exam  Constitutional:       Appearance: He is well-developed.   HENT:      Head: Normocephalic and atraumatic.   Eyes:      Extraocular Movements: EOM normal.      Conjunctiva/sclera: Conjunctivae normal.      Pupils: Pupils are equal, round, and reactive to light.   Neck:      Vascular: No carotid bruit.   Neurological:      Mental Status: He is oriented to person, place, and time.      Coordination: Finger-Nose-Finger Test and Heel to Shin Test normal.      Gait: Gait is intact.      Deep Tendon Reflexes:      Reflex Scores:       Tricep reflexes are 2+ on the right side and 2+ on the left side.       Bicep reflexes are 2+ on the right side and 2+ on the left side.       Brachioradialis reflexes are 2+ on the right side and 2+ on the left side.       Patellar reflexes are 2+ on the right side and 2+ on the left side.       Achilles reflexes are 2+ on the right side and 2+ on the left side.  Psychiatric:         Speech: Speech normal.       Neurologic Exam     Mental Status   Oriented to person, place, and time.   Registration of memory: Good recent and remote memory.   Attention: normal. Concentration: normal.   Speech: speech is normal   Level of consciousness: alert  Knowledge: consistent with education.     Cranial Nerves     CN II   Visual fields full to confrontation.   Visual acuity: normal    CN III, IV, VI   Pupils are equal, round, and reactive to light.  Extraocular motions are normal.     CN V    Facial sensation intact.   Right corneal reflex: normal  Left corneal reflex: normal    CN VII   Facial expression full, symmetric.   Right facial weakness: none  Left facial weakness: none    CN VIII   Hearing: intact    CN IX, X   Palate: symmetric    CN XI   Right sternocleidomastoid strength: normal  Left sternocleidomastoid strength: normal    CN XII   Tongue: not atrophic  Tongue deviation: none    Motor Exam   Muscle bulk: normal  Right arm tone: normal  Left arm tone: normal  Right leg tone: normal  Left leg tone: normal    Strength   Strength 5/5 except as noted.     Sensory Exam   Light touch normal.     Gait, Coordination, and Reflexes     Gait  Gait: normal    Coordination   Finger to nose coordination: normal  Heel to shin coordination: normal    Reflexes   Right brachioradialis: 2+  Left brachioradialis: 2+  Right biceps: 2+  Left biceps: 2+  Right triceps: 2+  Left triceps: 2+  Right patellar: 2+  Left patellar: 2+  Right achilles: 2+  Left achilles: 2+  Right : 2+  Left : 2+      Assessment/Plan   Independent Review of Radiographic Studies:      I reviewed his cervical MRI done on 6/7/2019 which shows cervical stenosis from C2-C6 due to both facet disease and disc protrusions with some myelomalacia at C2-C3 C3-C4 and C4-C5.  Agree with the report.    Medical Decision Making:      We will try Medrol Dosepak and reimage him with x-rays and MRI and have him come back to see me.  Hopefully we can manage him nonoperatively now that he is developed more significant symptoms.      Diagnoses and all orders for this visit:    1. Cervical spinal stenosis (Primary)  -     MRI Cervical Spine Without Contrast; Future  -     XR spine cervical ap and lat w flex and ext; Future    2. Chronic neck pain  -     MRI Cervical Spine Without Contrast; Future  -     XR spine cervical ap and lat w flex and ext; Future    3. Cervical radiculopathy  -     MRI Cervical Spine Without Contrast; Future  -     XR spine  cervical ap and lat w flex and ext; Future    Other orders  -     methylPREDNISolone (MEDROL) 4 MG dose pack; Take as directed on package instructions.  Dispense: 21 tablet; Refill: 0      Return in about 4 weeks (around 3/21/2022) for After MRI, face-to-face.

## 2022-02-21 ENCOUNTER — OFFICE VISIT (OUTPATIENT)
Dept: NEUROSURGERY | Facility: CLINIC | Age: 77
End: 2022-02-21

## 2022-02-21 VITALS
HEIGHT: 69 IN | HEART RATE: 82 BPM | BODY MASS INDEX: 30.96 KG/M2 | SYSTOLIC BLOOD PRESSURE: 140 MMHG | TEMPERATURE: 98 F | WEIGHT: 209 LBS | OXYGEN SATURATION: 100 % | DIASTOLIC BLOOD PRESSURE: 84 MMHG

## 2022-02-21 DIAGNOSIS — M48.02 CERVICAL SPINAL STENOSIS: Primary | ICD-10-CM

## 2022-02-21 DIAGNOSIS — M54.2 CHRONIC NECK PAIN: ICD-10-CM

## 2022-02-21 DIAGNOSIS — G89.29 CHRONIC NECK PAIN: ICD-10-CM

## 2022-02-21 DIAGNOSIS — M54.12 CERVICAL RADICULOPATHY: ICD-10-CM

## 2022-02-21 PROCEDURE — 99214 OFFICE O/P EST MOD 30 MIN: CPT | Performed by: NEUROLOGICAL SURGERY

## 2022-02-21 RX ORDER — METHYLPREDNISOLONE 4 MG/1
TABLET ORAL
Qty: 21 TABLET | Refills: 0 | Status: SHIPPED | OUTPATIENT
Start: 2022-02-21

## 2022-02-28 ENCOUNTER — HOSPITAL ENCOUNTER (OUTPATIENT)
Dept: GENERAL RADIOLOGY | Facility: HOSPITAL | Age: 77
Discharge: HOME | End: 2022-02-28
Admitting: NEUROLOGICAL SURGERY

## 2022-02-28 DIAGNOSIS — M48.02 CERVICAL SPINAL STENOSIS: ICD-10-CM

## 2022-02-28 DIAGNOSIS — M54.2 CHRONIC NECK PAIN: ICD-10-CM

## 2022-02-28 DIAGNOSIS — G89.29 CHRONIC NECK PAIN: ICD-10-CM

## 2022-02-28 DIAGNOSIS — M54.12 CERVICAL RADICULOPATHY: ICD-10-CM

## 2022-02-28 PROCEDURE — 72050 X-RAY EXAM NECK SPINE 4/5VWS: CPT

## 2022-03-16 ENCOUNTER — APPOINTMENT (OUTPATIENT)
Dept: OTHER | Facility: HOSPITAL | Age: 77
End: 2022-03-16

## 2022-03-16 ENCOUNTER — HOSPITAL ENCOUNTER (OUTPATIENT)
Dept: MRI IMAGING | Facility: HOSPITAL | Age: 77
Discharge: HOME | End: 2022-03-16

## 2022-03-16 DIAGNOSIS — Z09 FOLLOW UP: ICD-10-CM

## 2022-03-16 DIAGNOSIS — G89.29 CHRONIC NECK PAIN: ICD-10-CM

## 2022-03-16 DIAGNOSIS — M48.02 CERVICAL SPINAL STENOSIS: ICD-10-CM

## 2022-03-16 DIAGNOSIS — M54.2 CHRONIC NECK PAIN: ICD-10-CM

## 2022-03-16 DIAGNOSIS — M54.12 CERVICAL RADICULOPATHY: ICD-10-CM

## 2022-03-16 PROCEDURE — 72141 MRI NECK SPINE W/O DYE: CPT

## 2022-04-01 ENCOUNTER — APPOINTMENT (OUTPATIENT)
Dept: MRI IMAGING | Facility: HOSPITAL | Age: 77
End: 2022-04-01

## 2022-04-11 NOTE — PROGRESS NOTES
Subjective   Patient ID: Lemuel Borrego is a 76 y.o. male is here today for follow-up after MRI cervical/xray cervical done done 3/16/22.  Pt last seen on 2/21/22 and reported neck pain that radiates to R side and arm along with N/T in R hand.  Pt takes Gabapentin 300 mg bid.  Pt given a MDP at the last visit.    Today, Mr. Borrego reports constant neck pain that radiates into the right arm with weakness and tingling. He denies numbness. He reports that his hand is not as strong as it use to be    This gentleman has severe radiographic spinal stenosis and we have been following him for a while.  He began having more painful symptoms in the neck and the right arm and we got a new MRI.  He still has the same degree of stenosis from C2 down to C5 but nothing worse.  He remains radiculopathic but not myelopathic.  He has good strength but he has tenderness to palpation in his right shoulder and difficulty with pain with external rotation would suggest some superimposed shoulder arthritis.  We will get x-rays of the shoulder before the next visit.  I told him we should try and avoid surgery if at all possible.  Given that he is not myelopathic I do not see any mandatory reason for it.  We will try some physical therapy and perhaps blocks could be tried afterwards.  He is on aspirin.  He tells me has some triggering of his fourth and fifth finger on the right hand which is not bothersome.  I told him that is a separate issue.  If that gets worse we can send him to the hand doctors.        Neck Pain   The problem occurs constantly. The problem has been gradually worsening. The pain is present in the left side, midline and right side. The quality of the pain is described as aching. The symptoms are aggravated by position. Associated symptoms include tingling and weakness. Pertinent negatives include no chest pain, fever or numbness.       The following portions of the patient's history were reviewed and updated as  appropriate: allergies, current medications, past family history, past medical history, past social history, past surgical history and problem list.    Review of Systems   Constitutional: Negative for fever.   Respiratory: Negative for chest tightness and shortness of breath.    Cardiovascular: Negative for chest pain.   Musculoskeletal: Positive for neck pain.   Neurological: Positive for tingling and weakness. Negative for numbness.   All other systems reviewed and are negative.      Objective   Physical Exam  Constitutional:       Appearance: He is well-developed.   HENT:      Head: Normocephalic and atraumatic.   Eyes:      Extraocular Movements: EOM normal.      Conjunctiva/sclera: Conjunctivae normal.      Pupils: Pupils are equal, round, and reactive to light.   Neck:      Vascular: No carotid bruit.   Neurological:      Mental Status: He is oriented to person, place, and time.      Coordination: Finger-Nose-Finger Test and Heel to Shin Test normal.      Gait: Gait is intact.      Deep Tendon Reflexes:      Reflex Scores:       Tricep reflexes are 2+ on the right side and 2+ on the left side.       Bicep reflexes are 2+ on the right side and 2+ on the left side.       Brachioradialis reflexes are 2+ on the right side and 2+ on the left side.       Patellar reflexes are 2+ on the right side and 2+ on the left side.       Achilles reflexes are 2+ on the right side and 2+ on the left side.  Psychiatric:         Speech: Speech normal.       Neurologic Exam     Mental Status   Oriented to person, place, and time.   Registration of memory: Good recent and remote memory.   Attention: normal. Concentration: normal.   Speech: speech is normal   Level of consciousness: alert  Knowledge: consistent with education.     Cranial Nerves     CN II   Visual fields full to confrontation.   Visual acuity: normal    CN III, IV, VI   Pupils are equal, round, and reactive to light.  Extraocular motions are normal.     CN V    Facial sensation intact.   Right corneal reflex: normal  Left corneal reflex: normal    CN VII   Facial expression full, symmetric.   Right facial weakness: none  Left facial weakness: none    CN VIII   Hearing: intact    CN IX, X   Palate: symmetric    CN XI   Right sternocleidomastoid strength: normal  Left sternocleidomastoid strength: normal    CN XII   Tongue: not atrophic  Tongue deviation: none    Motor Exam   Muscle bulk: normal  Right arm tone: normal  Left arm tone: normal  Right leg tone: normal  Left leg tone: normal    Strength   Strength 5/5 except as noted.     Sensory Exam   Light touch normal.     Gait, Coordination, and Reflexes     Gait  Gait: normal    Coordination   Finger to nose coordination: normal  Heel to shin coordination: normal    Reflexes   Right brachioradialis: 2+  Left brachioradialis: 2+  Right biceps: 2+  Left biceps: 2+  Right triceps: 2+  Left triceps: 2+  Right patellar: 2+  Left patellar: 2+  Right achilles: 2+  Left achilles: 2+  Right : 2+  Left : 2+      Assessment/Plan   Independent Review of Radiographic Studies:      The cervical MRI done on 3/22/2022 shows radiographic severe stenosis at C2-C3 C3-C4 and C4-C5 but is not any different from the MRI done in 2019.  There is some myelomalacia at the same levels.  The x-rays done on 2/28/2022 do not show any instability but a lot of degenerative changes.  Agree with the report.      Medical Decision Making:      Again, he is radiculopathic but not myelopathic and he may have some superimposed shoulder problem so we will try some physical therapy and get a shoulder x-ray.  If that does not work we can try some injections but I would like to avoid surgery on him if at all possible.  We will see him in about 3 months.          Diagnoses and all orders for this visit:    1. Cervical spinal stenosis (Primary)  -     Ambulatory Referral to Physical Therapy Evaluate and treat    2. Cervical radiculopathy  -     Ambulatory  Referral to Physical Therapy Evaluate and treat    3. Chronic neck pain  -     Ambulatory Referral to Physical Therapy Evaluate and treat    4. Chronic right shoulder pain  -     XR shoulder 2+ vw right; Future      Return in about 3 months (around 7/20/2022) for Face-to-face.

## 2022-04-20 ENCOUNTER — OFFICE VISIT (OUTPATIENT)
Dept: NEUROSURGERY | Facility: CLINIC | Age: 77
End: 2022-04-20

## 2022-04-20 VITALS
SYSTOLIC BLOOD PRESSURE: 140 MMHG | DIASTOLIC BLOOD PRESSURE: 80 MMHG | OXYGEN SATURATION: 99 % | HEART RATE: 70 BPM | WEIGHT: 209 LBS | TEMPERATURE: 96.9 F | BODY MASS INDEX: 30.96 KG/M2 | HEIGHT: 69 IN

## 2022-04-20 DIAGNOSIS — M54.12 CERVICAL RADICULOPATHY: ICD-10-CM

## 2022-04-20 DIAGNOSIS — G89.29 CHRONIC NECK PAIN: ICD-10-CM

## 2022-04-20 DIAGNOSIS — M48.02 CERVICAL SPINAL STENOSIS: Primary | ICD-10-CM

## 2022-04-20 DIAGNOSIS — M25.511 CHRONIC RIGHT SHOULDER PAIN: ICD-10-CM

## 2022-04-20 DIAGNOSIS — M54.2 CHRONIC NECK PAIN: ICD-10-CM

## 2022-04-20 DIAGNOSIS — G89.29 CHRONIC RIGHT SHOULDER PAIN: ICD-10-CM

## 2022-04-20 PROCEDURE — 99214 OFFICE O/P EST MOD 30 MIN: CPT | Performed by: NEUROLOGICAL SURGERY

## 2022-05-02 ENCOUNTER — HOSPITAL ENCOUNTER (OUTPATIENT)
Dept: GENERAL RADIOLOGY | Facility: HOSPITAL | Age: 77
Discharge: HOME | End: 2022-05-02
Admitting: NEUROLOGICAL SURGERY

## 2022-05-02 DIAGNOSIS — M54.2 CHRONIC NECK PAIN: ICD-10-CM

## 2022-05-02 DIAGNOSIS — M48.02 CERVICAL SPINAL STENOSIS: ICD-10-CM

## 2022-05-02 DIAGNOSIS — G89.29 CHRONIC RIGHT SHOULDER PAIN: ICD-10-CM

## 2022-05-02 DIAGNOSIS — M25.511 CHRONIC RIGHT SHOULDER PAIN: ICD-10-CM

## 2022-05-02 DIAGNOSIS — G89.29 CHRONIC NECK PAIN: ICD-10-CM

## 2022-05-02 DIAGNOSIS — M54.12 CERVICAL RADICULOPATHY: ICD-10-CM

## 2022-05-02 PROCEDURE — 73030 X-RAY EXAM OF SHOULDER: CPT

## 2022-05-02 RX ORDER — GABAPENTIN 300 MG/1
CAPSULE ORAL
Qty: 60 CAPSULE | Refills: 5 | Status: SHIPPED | OUTPATIENT
Start: 2022-05-02 | End: 2022-11-21

## 2022-05-02 NOTE — TELEPHONE ENCOUNTER
Rx Refill Note  Requested Prescriptions     Pending Prescriptions Disp Refills   • gabapentin (NEURONTIN) 300 MG capsule [Pharmacy Med Name: GABAPENTIN 300MG CAPSULES] 60 capsule      Sig: TAKE 1 CAPSULE BY MOUTH TWICE DAILY      Last office visit with prescribing clinician: 4/20/2022      Next office visit with prescribing clinician: 7/20/2022            Kamryn Oliveros MA  05/02/22, 10:56 EDT

## 2022-06-06 ENCOUNTER — CLINICAL SUPPORT (OUTPATIENT)
Dept: ORTHOPEDIC SURGERY | Facility: CLINIC | Age: 77
End: 2022-06-06

## 2022-06-06 VITALS — WEIGHT: 200 LBS | HEIGHT: 69 IN | TEMPERATURE: 97.8 F | BODY MASS INDEX: 29.62 KG/M2

## 2022-06-06 DIAGNOSIS — M19.019 ARTHRITIS OF SHOULDER: Primary | ICD-10-CM

## 2022-06-06 PROCEDURE — 20610 DRAIN/INJ JOINT/BURSA W/O US: CPT | Performed by: ORTHOPAEDIC SURGERY

## 2022-06-06 RX ORDER — LIDOCAINE HYDROCHLORIDE 20 MG/ML
2 INJECTION, SOLUTION EPIDURAL; INFILTRATION; INTRACAUDAL; PERINEURAL
Status: COMPLETED | OUTPATIENT
Start: 2022-06-06 | End: 2022-06-06

## 2022-06-06 RX ORDER — METHYLPREDNISOLONE ACETATE 80 MG/ML
80 INJECTION, SUSPENSION INTRA-ARTICULAR; INTRALESIONAL; INTRAMUSCULAR; SOFT TISSUE
Status: COMPLETED | OUTPATIENT
Start: 2022-06-06 | End: 2022-06-06

## 2022-06-06 RX ADMIN — METHYLPREDNISOLONE ACETATE 80 MG: 80 INJECTION, SUSPENSION INTRA-ARTICULAR; INTRALESIONAL; INTRAMUSCULAR; SOFT TISSUE at 09:16

## 2022-06-06 RX ADMIN — LIDOCAINE HYDROCHLORIDE 2 ML: 20 INJECTION, SOLUTION EPIDURAL; INFILTRATION; INTRACAUDAL; PERINEURAL at 09:16

## 2022-06-06 NOTE — PROGRESS NOTES
Mr. Borrego comes in today for follow-up.  Injections have worked well in the past.  The patient would like to get a repeat injection today.  The risks, benefits and alternatives were discussed and the patient consented.  Going forward, the patient will follow-up as needed.    Bubba Woodard MD    06/06/2022        Large Joint Arthrocentesis: R glenohumeral  Date/Time: 6/6/2022 9:16 AM  Consent given by: patient  Site marked: site marked  Timeout: Immediately prior to procedure a time out was called to verify the correct patient, procedure, equipment, support staff and site/side marked as required   Supporting Documentation  Indications: pain   Procedure Details  Location: shoulder - R glenohumeral  Preparation: Patient was prepped and draped in the usual sterile fashion  Needle gauge: 21G.  Approach: posterior  Medications administered: 80 mg methylPREDNISolone acetate 80 MG/ML; 2 mL lidocaine PF 2% 2 %  Patient tolerance: patient tolerated the procedure well with no immediate complications

## 2022-06-10 ENCOUNTER — TELEPHONE (OUTPATIENT)
Dept: NEUROSURGERY | Facility: CLINIC | Age: 77
End: 2022-06-10

## 2022-06-10 NOTE — TELEPHONE ENCOUNTER
The [facility] received a fax that requires your attention. The document has been indexed to the patient’s chart for your review.      Reason for sending: PLAN OF CARE DOC TO BE SIGNED    Documents Description: PLN OF CR TO BE SIGNED_[facility]_6.7.22    Name of Sender: [facility]    Date Indexed: 06/10/22    Notes (if needed): FAX BACK TO [facility] ONCE COMPLETED. THANK YOU.

## 2022-07-11 ENCOUNTER — TELEPHONE (OUTPATIENT)
Dept: NEUROSURGERY | Facility: CLINIC | Age: 77
End: 2022-07-11

## 2022-07-20 ENCOUNTER — OFFICE VISIT (OUTPATIENT)
Dept: NEUROSURGERY | Facility: CLINIC | Age: 77
End: 2022-07-20

## 2022-07-20 VITALS
WEIGHT: 200.18 LBS | SYSTOLIC BLOOD PRESSURE: 140 MMHG | HEIGHT: 69 IN | OXYGEN SATURATION: 98 % | TEMPERATURE: 97.8 F | DIASTOLIC BLOOD PRESSURE: 80 MMHG | HEART RATE: 76 BPM | BODY MASS INDEX: 29.65 KG/M2

## 2022-07-20 DIAGNOSIS — M48.02 CERVICAL SPINAL STENOSIS: ICD-10-CM

## 2022-07-20 DIAGNOSIS — M54.12 CERVICAL RADICULOPATHY: ICD-10-CM

## 2022-07-20 DIAGNOSIS — M25.511 CHRONIC RIGHT SHOULDER PAIN: Primary | ICD-10-CM

## 2022-07-20 DIAGNOSIS — G89.29 CHRONIC NECK PAIN: ICD-10-CM

## 2022-07-20 DIAGNOSIS — M54.2 CHRONIC NECK PAIN: ICD-10-CM

## 2022-07-20 DIAGNOSIS — G89.29 CHRONIC RIGHT SHOULDER PAIN: Primary | ICD-10-CM

## 2022-07-20 PROCEDURE — 99214 OFFICE O/P EST MOD 30 MIN: CPT | Performed by: NEUROLOGICAL SURGERY

## 2022-07-20 NOTE — PROGRESS NOTES
Subjective   Patient ID: Lemuel Borrego is a 76 y.o. male is here today for follow-up for neck and right arm pain after physical therapy.    Mr. Borrego reports his pain is a little bit better with physical therapy. He is now going to therapy once a week. He reports today he is having the right arm pain that starts in his shoulder. He reports numbness and tingling of his thumb on the right hand.     I been following this patient for radiographic cervical stenosis for a few years.  He began having some right arm pain and so became radiculopathic but not myelopathic.  We also checked his right shoulder x-rays and he does have some element of arthritis and I think that superimposed upon the nerve pain.  There is still some tenderness to palpation in the shoulder and stiffness with external rotation.  I told him we would send him to orthopedics for that.  Perhaps steroid injection could be helpful.  He thinks his radiating right arm pain is a bit better.  He has a few more therapy visits.  [place] encouraged him to finish it out and continue exercises on his own.  I like to avoid neck surgery if we can.  I will see him in 3 months.  I stressed he is not myelopathic but radiculopathic.        Neck Pain   This is a chronic problem. The current episode started more than 1 year ago. The problem occurs constantly. The problem has been gradually improving. The pain is associated with nothing. The pain is present in the right side. The quality of the pain is described as aching. The pain is at a severity of 4/10. The pain is mild. The symptoms are aggravated by bending, twisting and position. Associated symptoms include numbness and tingling. Pertinent negatives include no fever, leg pain or weakness.       The following portions of the patient's history were reviewed and updated as appropriate: allergies, current medications, past family history, past medical history, past social history, past surgical history and problem  "list.    Review of Systems   Constitutional: Negative for activity change and fever.   Musculoskeletal: Positive for neck pain.   Neurological: Positive for tingling and numbness. Negative for weakness.   Psychiatric/Behavioral: Positive for sleep disturbance.   All other systems reviewed and are negative.          Objective     Vitals:    07/20/22 0945   BP: 140/80   Pulse: 76   Temp: 97.8 °F (36.6 °C)   SpO2: 98%   Weight: 90.8 kg (200 lb 2.8 oz)   Height: 175.3 cm (69\")     Body mass index is 29.56 kg/m².      Physical Exam  Constitutional:       Appearance: He is well-developed.   HENT:      Head: Normocephalic and atraumatic.   Eyes:      Extraocular Movements: EOM normal.      Conjunctiva/sclera: Conjunctivae normal.      Pupils: Pupils are equal, round, and reactive to light.   Neck:      Vascular: No carotid bruit.   Neurological:      Mental Status: He is oriented to person, place, and time.      Coordination: Finger-Nose-Finger Test and Heel to Shin Test normal.      Gait: Gait is intact.      Deep Tendon Reflexes:      Reflex Scores:       Tricep reflexes are 2+ on the right side and 2+ on the left side.       Bicep reflexes are 2+ on the right side and 2+ on the left side.       Brachioradialis reflexes are 2+ on the right side and 2+ on the left side.       Patellar reflexes are 2+ on the right side and 2+ on the left side.       Achilles reflexes are 2+ on the right side and 2+ on the left side.  Psychiatric:         Speech: Speech normal.       Neurologic Exam     Mental Status   Oriented to person, place, and time.   Registration of memory: Good recent and remote memory.   Attention: normal. Concentration: normal.   Speech: speech is normal   Level of consciousness: alert  Knowledge: consistent with education.     Cranial Nerves     CN II   Visual fields full to confrontation.   Visual acuity: normal    CN III, IV, VI   Pupils are equal, round, and reactive to light.  Extraocular motions are normal. "     CN V   Facial sensation intact.   Right corneal reflex: normal  Left corneal reflex: normal    CN VII   Facial expression full, symmetric.   Right facial weakness: none  Left facial weakness: none    CN VIII   Hearing: intact    CN IX, X   Palate: symmetric    CN XI   Right sternocleidomastoid strength: normal  Left sternocleidomastoid strength: normal    CN XII   Tongue: not atrophic  Tongue deviation: none    Motor Exam   Muscle bulk: normal  Right arm tone: normal  Left arm tone: normal  Right leg tone: normal  Left leg tone: normal    Strength   Strength 5/5 except as noted.     Sensory Exam   Light touch normal.     Gait, Coordination, and Reflexes     Gait  Gait: normal    Coordination   Finger to nose coordination: normal  Heel to shin coordination: normal    Reflexes   Right brachioradialis: 2+  Left brachioradialis: 2+  Right biceps: 2+  Left biceps: 2+  Right triceps: 2+  Left triceps: 2+  Right patellar: 2+  Left patellar: 2+  Right achilles: 2+  Left achilles: 2+  Right : 2+  Left : 2+          Assessment & Plan   Independent Review of Radiographic Studies:      I personally reviewed the images from the following studies.    The cervical MRI done on 3/22/2022 shows radiographic severe stenosis at C2-C3 C3-C4 and C4-C5 but is not any different from the MRI done in 2019.  There is some myelomalacia at the same levels.  The x-rays done on 2/28/2022 do not show any instability but a lot of degenerative changes.    The shoulder x-rays done on 5/2/2022 show some modest amount of right shoulder arthritis.  Agree with the report.    Medical Decision Making:      He will finish out his therapy and continue his exercises on his own.  We will send him to Dr. Woodard to look at his right shoulder and I will see him in 3 months.  He is not myelopathic and I like to avoid surgery for his radiculopathy if at all possible.      Diagnoses and all orders for this visit:    1. Chronic right shoulder pain  (Primary)  -     Ambulatory Referral to Orthopedic Surgery    2. Cervical spinal stenosis    3. Chronic neck pain    4. Cervical radiculopathy      Return in about 3 months (around 10/20/2022) for Face-to-face.

## 2022-09-07 ENCOUNTER — CLINICAL SUPPORT (OUTPATIENT)
Dept: ORTHOPEDIC SURGERY | Facility: CLINIC | Age: 77
End: 2022-09-07

## 2022-09-07 VITALS — TEMPERATURE: 97.1 F | WEIGHT: 214.6 LBS | HEIGHT: 69 IN | BODY MASS INDEX: 31.78 KG/M2

## 2022-09-07 DIAGNOSIS — M19.019 ARTHRITIS OF SHOULDER: Primary | ICD-10-CM

## 2022-09-07 PROCEDURE — 20610 DRAIN/INJ JOINT/BURSA W/O US: CPT | Performed by: ORTHOPAEDIC SURGERY

## 2022-09-07 RX ORDER — METHYLPREDNISOLONE ACETATE 40 MG/ML
40 INJECTION, SUSPENSION INTRA-ARTICULAR; INTRALESIONAL; INTRAMUSCULAR; SOFT TISSUE
Status: COMPLETED | OUTPATIENT
Start: 2022-09-07 | End: 2022-09-07

## 2022-09-07 RX ADMIN — METHYLPREDNISOLONE ACETATE 40 MG: 40 INJECTION, SUSPENSION INTRA-ARTICULAR; INTRALESIONAL; INTRAMUSCULAR; SOFT TISSUE at 09:15

## 2022-09-07 NOTE — PROGRESS NOTES
Mr. Borrego follows up again today for the right shoulder.  The last injection did help.  He would like to get that repeated.  The risk, benefits and alternatives were discussed but he consented and the injection was performed as described below.  Of note, he does mention that he feels like his neck is getting worse.  I told him that he should go back and talk to Dr. Beavers.  It looks like he has an appointment scheduled in November.    Large Joint Arthrocentesis: R glenohumeral  Date/Time: 9/7/2022 9:15 AM  Consent given by: patient  Site marked: site marked  Timeout: Immediately prior to procedure a time out was called to verify the correct patient, procedure, equipment, support staff and site/side marked as required   Supporting Documentation  Indications: pain   Procedure Details  Location: shoulder - R glenohumeral  Preparation: Patient was prepped and draped in the usual sterile fashion  Needle gauge: 21.  Approach: posterior  Medications administered: 40 mg methylPREDNISolone acetate 40 MG/ML; 2 mL lidocaine (cardiac)  Patient tolerance: patient tolerated the procedure well with no immediate complications

## 2022-11-16 NOTE — PROGRESS NOTES
Subjective   Patient ID: Lemuel Borrego is a 77 y.o. male is here today for follow-up. Mr. Borrego was last seen on 07-20-22 with complaints fo right shoulder and arm pain with numbness and tingling in his right thumb. He was referred back to Orthopedic surgery.    Today, Mr. Borrego reports right shoulder pain and right arm pain with numbness in his right hand.     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  He has chronic neck pain and chronic right shoulder pain.  The injections by Dr. Woodard seem to help his right shoulder pain but he continues to have some upper arm pain and distal arm pain on the right which I think is attributable to his stenosis which is at multiple levels in the neck.  He is on gabapentin at 300 mg twice daily.  He does not think the pain is absolutely intractable and he has actually decent strength in that limb.  He does have some trigger finger on multiple fingers on the right and some on the left.  But he is not myelopathic.  He has cervical stenosis radiographically that was seen on a variety of cervical MRIs.  The pain is in absolutely intractable so we will stick to the gabapentin and increase it from 300 mg twice daily to 600 mg twice daily and follow him and have him come back in 6 months.  If the arm pain is absolutely intractable then we might need to consider surgery.  He is not myelopathic set on think we have to do it for those reasons.    History of Present Illness    The following portions of the patient's history were reviewed and updated as appropriate: allergies, current medications, past family history, past medical history, past social history, past surgical history and problem list.    Review of Systems   Respiratory: Negative for chest tightness and shortness of breath.    Cardiovascular: Negative for chest pain.   Neurological: Positive for numbness.           Objective     Vitals:    11/21/22 1102   BP: 126/78  "  Pulse: 80   SpO2: 96%   Weight: 97.1 kg (214 lb)   Height: 175.3 cm (69\")     Body mass index is 31.6 kg/m².    Tobacco Use: Low Risk    • Smoking Tobacco Use: Never   • Smokeless Tobacco Use: Never   • Passive Exposure: Not on file          Physical Exam  Constitutional:       Appearance: He is well-developed.   HENT:      Head: Normocephalic and atraumatic.   Eyes:      Extraocular Movements: EOM normal.      Conjunctiva/sclera: Conjunctivae normal.      Pupils: Pupils are equal, round, and reactive to light.   Neck:      Vascular: No carotid bruit.   Neurological:      Mental Status: He is oriented to person, place, and time.      Coordination: Finger-Nose-Finger Test and Heel to Shin Test normal.      Gait: Gait is intact.      Deep Tendon Reflexes:      Reflex Scores:       Tricep reflexes are 2+ on the right side and 2+ on the left side.       Bicep reflexes are 2+ on the right side and 2+ on the left side.       Brachioradialis reflexes are 2+ on the right side and 2+ on the left side.       Patellar reflexes are 2+ on the right side and 2+ on the left side.       Achilles reflexes are 2+ on the right side and 2+ on the left side.  Psychiatric:         Speech: Speech normal.       Neurologic Exam     Mental Status   Oriented to person, place, and time.   Registration of memory: Good recent and remote memory.   Attention: normal. Concentration: normal.   Speech: speech is normal   Level of consciousness: alert  Knowledge: consistent with education.     Cranial Nerves     CN II   Visual fields full to confrontation.   Visual acuity: normal    CN III, IV, VI   Pupils are equal, round, and reactive to light.  Extraocular motions are normal.     CN V   Facial sensation intact.   Right corneal reflex: normal  Left corneal reflex: normal    CN VII   Facial expression full, symmetric.   Right facial weakness: none  Left facial weakness: none    CN VIII   Hearing: intact    CN IX, X   Palate: symmetric    CN XI "   Right sternocleidomastoid strength: normal  Left sternocleidomastoid strength: normal    CN XII   Tongue: not atrophic  Tongue deviation: none    Motor Exam   Muscle bulk: normal  Right arm tone: normal  Left arm tone: normal  Right leg tone: normal  Left leg tone: normal    Strength   Strength 5/5 except as noted.     Sensory Exam   Light touch normal.     Gait, Coordination, and Reflexes     Gait  Gait: normal    Coordination   Finger to nose coordination: normal  Heel to shin coordination: normal    Reflexes   Right brachioradialis: 2+  Left brachioradialis: 2+  Right biceps: 2+  Left biceps: 2+  Right triceps: 2+  Left triceps: 2+  Right patellar: 2+  Left patellar: 2+  Right achilles: 2+  Left achilles: 2+  Right : 2+  Left : 2+          Assessment & Plan   Independent Review of Radiographic Studies:      I personally reviewed the images from the following studies.    The cervical MRI done on 3/22/2022 shows radiographic severe stenosis at C2-C3 C3-C4 and C4-C5 but is not any different from the MRI done in 2019.  There is some myelomalacia at the same levels.  The x-rays done on 2/28/2022 do not show any instability but a lot of degenerative changes.    The shoulder x-rays done on 5/2/2022 show some modest amount of right shoulder arthritis.  Agree with the report.    Medical Decision Making:      Will increase the gabapentin to 600 mg twice daily and have him come back in 6 months.  Again, we might need to do surgery for radiculopathy in the future but we will see if the increase in the gabapentin will be sufficient.  He is not myelopathic, but radiculopathic.      Diagnoses and all orders for this visit:    1. Chronic right shoulder pain (Primary)    2. Cervical spinal stenosis  -     gabapentin (NEURONTIN) 600 MG tablet; Take 1 tablet by mouth 2 (Two) Times a Day.  Dispense: 60 tablet; Refill: 5    3. Chronic neck pain    4. Cervical radiculopathy  -     gabapentin (NEURONTIN) 600 MG tablet; Take 1  tablet by mouth 2 (Two) Times a Day.  Dispense: 60 tablet; Refill: 5      Return in about 6 months (around 5/21/2023) for face to face.

## 2022-11-21 ENCOUNTER — OFFICE VISIT (OUTPATIENT)
Dept: NEUROSURGERY | Facility: CLINIC | Age: 77
End: 2022-11-21

## 2022-11-21 VITALS
WEIGHT: 214 LBS | SYSTOLIC BLOOD PRESSURE: 126 MMHG | BODY MASS INDEX: 31.7 KG/M2 | OXYGEN SATURATION: 96 % | HEIGHT: 69 IN | HEART RATE: 80 BPM | DIASTOLIC BLOOD PRESSURE: 78 MMHG

## 2022-11-21 DIAGNOSIS — G89.29 CHRONIC RIGHT SHOULDER PAIN: Primary | ICD-10-CM

## 2022-11-21 DIAGNOSIS — M25.511 CHRONIC RIGHT SHOULDER PAIN: Primary | ICD-10-CM

## 2022-11-21 DIAGNOSIS — M48.02 CERVICAL SPINAL STENOSIS: ICD-10-CM

## 2022-11-21 DIAGNOSIS — M54.2 CHRONIC NECK PAIN: ICD-10-CM

## 2022-11-21 DIAGNOSIS — G89.29 CHRONIC NECK PAIN: ICD-10-CM

## 2022-11-21 DIAGNOSIS — M54.12 CERVICAL RADICULOPATHY: ICD-10-CM

## 2022-11-21 PROCEDURE — 99214 OFFICE O/P EST MOD 30 MIN: CPT | Performed by: NEUROLOGICAL SURGERY

## 2022-11-21 RX ORDER — GABAPENTIN 600 MG/1
600 TABLET ORAL 2 TIMES DAILY
Qty: 60 TABLET | Refills: 5 | Status: SHIPPED | OUTPATIENT
Start: 2022-11-21

## 2022-12-07 ENCOUNTER — CLINICAL SUPPORT (OUTPATIENT)
Dept: ORTHOPEDIC SURGERY | Facility: CLINIC | Age: 77
End: 2022-12-07

## 2022-12-07 VITALS — BODY MASS INDEX: 31.84 KG/M2 | HEIGHT: 69 IN | WEIGHT: 215 LBS | TEMPERATURE: 97.1 F

## 2022-12-07 DIAGNOSIS — M19.019 ARTHRITIS OF SHOULDER: Primary | ICD-10-CM

## 2022-12-07 PROCEDURE — 20610 DRAIN/INJ JOINT/BURSA W/O US: CPT | Performed by: ORTHOPAEDIC SURGERY

## 2022-12-07 RX ORDER — METHYLPREDNISOLONE ACETATE 80 MG/ML
80 INJECTION, SUSPENSION INTRA-ARTICULAR; INTRALESIONAL; INTRAMUSCULAR; SOFT TISSUE
Status: COMPLETED | OUTPATIENT
Start: 2022-12-07 | End: 2022-12-07

## 2022-12-07 RX ADMIN — METHYLPREDNISOLONE ACETATE 80 MG: 80 INJECTION, SUSPENSION INTRA-ARTICULAR; INTRALESIONAL; INTRAMUSCULAR; SOFT TISSUE at 10:11

## 2022-12-07 NOTE — PROGRESS NOTES
Mr. Borrego comes in today for follow-up.  Injections have worked well in the past.  He would like to get a repeat injection today.  The risks, benefits and alternatives were discussed and the patient consented.  Going forward, the patient will follow-up as needed.    Bubba Woodard MD    12/07/2022    Large Joint Arthrocentesis: R glenohumeral  Date/Time: 12/7/2022 10:11 AM  Consent given by: patient  Site marked: site marked  Timeout: Immediately prior to procedure a time out was called to verify the correct patient, procedure, equipment, support staff and site/side marked as required   Supporting Documentation  Indications: pain   Procedure Details  Location: shoulder - R glenohumeral  Preparation: Patient was prepped and draped in the usual sterile fashion  Needle gauge: 21G.  Approach: anterior  Medications administered: 80 mg methylPREDNISolone acetate 80 MG/ML; 2 mL lidocaine (cardiac)  Patient tolerance: patient tolerated the procedure well with no immediate complications

## 2023-03-08 ENCOUNTER — CLINICAL SUPPORT (OUTPATIENT)
Dept: ORTHOPEDIC SURGERY | Facility: CLINIC | Age: 78
End: 2023-03-08
Payer: MEDICARE

## 2023-03-08 VITALS — BODY MASS INDEX: 30.27 KG/M2 | TEMPERATURE: 97.4 F | WEIGHT: 204.4 LBS | HEIGHT: 69 IN

## 2023-03-08 DIAGNOSIS — M19.019 ARTHRITIS OF SHOULDER: Primary | ICD-10-CM

## 2023-03-08 PROCEDURE — 20610 DRAIN/INJ JOINT/BURSA W/O US: CPT | Performed by: ORTHOPAEDIC SURGERY

## 2023-03-08 RX ORDER — LIDOCAINE HYDROCHLORIDE 10 MG/ML
2 INJECTION, SOLUTION EPIDURAL; INFILTRATION; INTRACAUDAL; PERINEURAL
Status: COMPLETED | OUTPATIENT
Start: 2023-03-08 | End: 2023-03-08

## 2023-03-08 RX ORDER — METHYLPREDNISOLONE ACETATE 80 MG/ML
80 INJECTION, SUSPENSION INTRA-ARTICULAR; INTRALESIONAL; INTRAMUSCULAR; SOFT TISSUE
Status: COMPLETED | OUTPATIENT
Start: 2023-03-08 | End: 2023-03-08

## 2023-03-08 RX ADMIN — LIDOCAINE HYDROCHLORIDE 2 ML: 10 INJECTION, SOLUTION EPIDURAL; INFILTRATION; INTRACAUDAL; PERINEURAL at 08:27

## 2023-03-08 RX ADMIN — METHYLPREDNISOLONE ACETATE 80 MG: 80 INJECTION, SUSPENSION INTRA-ARTICULAR; INTRALESIONAL; INTRAMUSCULAR; SOFT TISSUE at 08:27

## 2023-03-08 NOTE — PROGRESS NOTES
Mr. Borrego would like to get a repeat injection today.  He says the last injection really helped quite a bit.  The risks, benefits and alternatives were discussed and the patient consented.  The injection was performed as described below.  It was painful for him and he count of withdrew as I performed the injection.  I do think we got most of the medicine in the joint though.  Going forward, may consider switching back to posterior injections for him if he elects for further injections.    Bubba Woodard MD    03/08/2023    Large Joint Arthrocentesis: R glenohumeral  Date/Time: 3/8/2023 8:27 AM  Consent given by: patient  Site marked: site marked  Timeout: Immediately prior to procedure a time out was called to verify the correct patient, procedure, equipment, support staff and site/side marked as required   Supporting Documentation  Indications: pain   Procedure Details  Location: shoulder - R glenohumeral  Preparation: Patient was prepped and draped in the usual sterile fashion  Needle gauge: 21 G.  Approach: anterior  Medications administered: 80 mg methylPREDNISolone acetate 80 MG/ML; 2 mL lidocaine PF 1% 1 %  Patient tolerance: patient tolerated the procedure well with no immediate complications

## 2023-05-30 ENCOUNTER — TELEPHONE (OUTPATIENT)
Dept: NEUROSURGERY | Facility: CLINIC | Age: 78
End: 2023-05-30

## 2023-05-30 NOTE — TELEPHONE ENCOUNTER
Contacted patient had to leave [facility] patients appointment for may 31 had to be rescheduled due to an emergency new appointment is for June 12 @ 1:45pm.

## 2023-05-31 NOTE — TELEPHONE ENCOUNTER
Caller: Lemuel Borrego    Relationship to patient: Self    Best call back number: [phone]    Chief complaint: PATIENT APPT W/ DR ALMEIDA HAD TO BE CANCELED AND WAS RESCHEDULED FOR 6/12/2023.    PATIENT WILL BE OUT OF TOWN AND UNABLE TO COME TO APPTS BETWEEN 6/6/23 - 6/14/23.    REQUESTING RESCHEDULING W/ APPROPRIATE PROVIDER OUTSIDER OF THAT DATE RANGE.    Type of visit: '6 MONTH FU'    Requested date: F/A OUTSIDE ABOVE DATE RANGE.    If rescheduling, when is the original appointment: Appointment with Lauren Jimenez APRN (06/12/2023)      Additional notes: PATIENT REQUESTING CALL BACK TO HOME # WHICH HAS VM W/ NEW APPT DATE/TIME. ADVISED HIM TO ALLOW US 2 BUSINESS DAYS (BEFORE EOW) TO RESCHEDULE. PATIENT AGREEABLE TO TIMEFRAME.

## 2023-06-01 NOTE — TELEPHONE ENCOUNTER
PATIENT HAS BEEN RESCHEDULED FROM 6/12/23 TO F/A RAKESH/ CARINE LOBATO APRN 7/11/23. Appointment with Carine Lobato APRN (07/11/2023) - PATIENT REMAINS ON CX WAITLIST.     ADVISED TO ARRIVE 15MIN EARLY, BRING INS CARD, RX LIST, ID. ALL VISITS RESCHEDULED.

## 2023-06-02 DIAGNOSIS — M48.02 CERVICAL SPINAL STENOSIS: ICD-10-CM

## 2023-06-02 DIAGNOSIS — M54.12 CERVICAL RADICULOPATHY: ICD-10-CM

## 2023-06-02 RX ORDER — GABAPENTIN 600 MG/1
TABLET ORAL
Qty: 60 TABLET | Refills: 5 | Status: SHIPPED | OUTPATIENT
Start: 2023-06-02

## 2023-10-24 ENCOUNTER — OFFICE VISIT (OUTPATIENT)
Dept: NEUROSURGERY | Facility: CLINIC | Age: 78
End: 2023-10-24
Payer: MEDICARE

## 2023-10-24 VITALS — SYSTOLIC BLOOD PRESSURE: 120 MMHG | HEART RATE: 74 BPM | OXYGEN SATURATION: 100 % | DIASTOLIC BLOOD PRESSURE: 78 MMHG

## 2023-10-24 DIAGNOSIS — M54.12 CERVICAL RADICULOPATHY: ICD-10-CM

## 2023-10-24 DIAGNOSIS — M48.02 CERVICAL SPINAL STENOSIS: Primary | ICD-10-CM

## 2023-10-24 DIAGNOSIS — R26.81 GAIT INSTABILITY: ICD-10-CM

## 2023-10-24 DIAGNOSIS — M79.601 RIGHT ARM PAIN: ICD-10-CM

## 2023-10-24 PROCEDURE — 1160F RVW MEDS BY RX/DR IN RCRD: CPT | Performed by: NURSE PRACTITIONER

## 2023-10-24 PROCEDURE — 99214 OFFICE O/P EST MOD 30 MIN: CPT | Performed by: NURSE PRACTITIONER

## 2023-10-24 PROCEDURE — 1159F MED LIST DOCD IN RCRD: CPT | Performed by: NURSE PRACTITIONER

## 2023-10-24 NOTE — PROGRESS NOTES
Subjective   Patient ID: Lemuel Borrego is a 78 y.o. male is here today for follow-up for neck and arm pain. Pain, numbness and tingling radiating from neck into right shoulder.     History of Present Illness    He follows up today with complaints of right shoulder as well as right arm pain with numbness and tingling in the hand.  He was last seen in early July and gabapentin was increased from 300 mg to 600 mg twice daily.  His most recent cervical MRI was in March 2022.    Today, he reports ongoing right upper arm pain as well as a feeling of weakness throughout the whole arm and hand.  He also reports that the right hand and all of the fingers feel numb with a tingling sensation.  Overall he feels that his symptoms have worsened since his last office visit.  At his last office visit he was advised to increase his gabapentin, but the patient reports that he did not.  He remains on the 300 mg dose twice daily.  He also reports some concerns with his balance and dragging his feet.  He denies any falls but reports feeling unstable.  He denies any leg pain.  He denies any left arm pain.  He denies any changes to his bowel or bladder function as well as saddle paresthesias.  He takes Tylenol intermittently for discomfort, otherwise he does not take any other pain medicine.    He presents unaccompanied.      The following portions of the patient's history were reviewed and updated as appropriate: allergies, current medications, past family history, past medical history, past social history, past surgical history, and problem list.    Review of Systems   HENT:  Negative for trouble swallowing.    Genitourinary:  Negative for difficulty urinating and enuresis.   Musculoskeletal:  Positive for neck pain and neck stiffness. Negative for gait problem.   Neurological:  Positive for weakness and numbness.       /78   Pulse 74   SpO2 100%       Objective     Vitals:    10/24/23 0923   BP: 120/78   Pulse: 74   SpO2: 100%      There is no height or weight on file to calculate BMI.    Tobacco Use: Low Risk  (10/24/2023)    Patient History     Smoking Tobacco Use: Never     Smokeless Tobacco Use: Never     Passive Exposure: Not on file          Physical Exam  Vitals reviewed.   Constitutional:       Appearance: Normal appearance.   HENT:      Head: Atraumatic.   Pulmonary:      Effort: Pulmonary effort is normal.   Musculoskeletal:         General: No tenderness. Normal range of motion.      Comments: Strength equal and intact bilateral lower extremities with the exception of 4+/5 weakness with right thumb to index finger intrinsics  Normal muscle bulk and tone bilateral upper extremities  Normal rapid finger movements   Skin:     General: Skin is warm and dry.   Neurological:      Mental Status: He is alert and oriented to person, place, and time.      GCS: GCS eye subscore is 4. GCS verbal subscore is 5. GCS motor subscore is 6.      Sensory: Sensory deficit present.      Motor: No weakness.      Gait: Gait abnormal.      Deep Tendon Reflexes:      Reflex Scores:       Tricep reflexes are 2+ on the right side and 2+ on the left side.       Bicep reflexes are 2+ on the right side and 2+ on the left side.       Brachioradialis reflexes are 2+ on the right side and 2+ on the left side.     Comments: Gait is upright, but slow, purposeful with some dragging of his feet  The patient is able to lift his feet and walk more fluidly on command  Able to stand on heels and toes with assistance  Sensation intact to soft touch bilateral upper extremities with exception of right lateral upper arm   Psychiatric:         Mood and Affect: Mood normal.       Neurologic Exam     Mental Status   Oriented to person, place, and time.     Gait, Coordination, and Reflexes     Reflexes   Right brachioradialis: 2+  Left brachioradialis: 2+  Right biceps: 2+  Left biceps: 2+  Right triceps: 2+  Left triceps: 2+          Assessment & Plan   Independent Review of  Radiographic Studies:      I personally reviewed the images from the following studies.    No new imaging    Medical Decision Making:      He presents office today for further evaluation of right upper arm pain and hand paresthesias, as well as new complaints of gait instability.  Exam as noted above, no red flags.  I have ordered new cervical MRI imaging as his last study was in early 2022.  He does feel that his symptoms have significantly worsened since then.  He has worsening paresthesias in the right hand and fingers.  The patient reports that he did not increase the gabapentin dose, as we previously discussed at his last office visit.  We discussed this in detail and he will increase the dose to 600 mg twice daily.  I wrote instructions down for the patient and gave them to him regarding the medication dosing increase.    I ordered physical therapy for the balance issues as well as for the right arm pain and paresthesias.  We will see him back in the office after trial of PT as well as once the cervical MRI is completed to discuss the findings.  The MRI is stable, I discussed that he may benefit from cervical epidural injections.  This will be discussed more at his next office visit.  Fortunately, he is overall intact on exam with the exception of slight right thumb weakness.    Plan: Cervical MRI without contrast, referral to PT, increased dose of gabapentin as directed, follow-up in 2 months         Diagnoses and all orders for this visit:    1. Cervical spinal stenosis (Primary)  -     MRI Cervical Spine With Contrast; Future  -     Ambulatory Referral to Physical Therapy Evaluate and treat; Stretching, ROM, Strengthening    2. Cervical radiculopathy  -     MRI Cervical Spine With Contrast; Future  -     Ambulatory Referral to Physical Therapy Evaluate and treat; Stretching, ROM, Strengthening    3. Right arm pain  -     MRI Cervical Spine With Contrast; Future  -     Ambulatory Referral to Physical Therapy  Evaluate and treat; Stretching, ROM, Strengthening    4. Gait instability  -     Ambulatory Referral to Physical Therapy Evaluate and treat; Stretching, ROM, Strengthening      Return in about 2 months (around 12/24/2023).

## 2023-12-01 ENCOUNTER — TELEPHONE (OUTPATIENT)
Dept: NEUROSURGERY | Facility: CLINIC | Age: 78
End: 2023-12-01
Payer: MEDICARE

## 2023-12-01 NOTE — TELEPHONE ENCOUNTER
Called and lvm to see it patient can come in on 12/20 or 12/21 to see ann marie. Carine is out of the office on 12/26

## 2023-12-04 NOTE — TELEPHONE ENCOUNTER
Patient is hospitalized, daughter brian will call back to reschedule appointment for a later date.     Can be scheduled first available with any APC.

## 2023-12-04 NOTE — TELEPHONE ENCOUNTER
PATIENT'S DAUGHTER SHADIA CALLED TO LET US KNOW HER FATHER WAS IN THE HOSPITAL AT [facility] - THEY DID A CT OF HIS CERVICAL SPINE - SHE WANTS TO KNOW IF THAT WILL SUFFICE FOR HIS F/U OR WILL HE STILL NEED THE MRI? PLEASE CALL DAUGHTER BACK TO ADVISE - THANKS.

## 2024-01-03 ENCOUNTER — TELEPHONE (OUTPATIENT)
Dept: NEUROSURGERY | Facility: CLINIC | Age: 79
End: 2024-01-03
Payer: MEDICARE

## 2024-01-03 NOTE — TELEPHONE ENCOUNTER
Called and spoke with patients daughter brian advised them that the order was only for a MRI of the cervical, she expressed understanding and will try to get the MRI rescheduled and call us back to f/u.

## 2024-01-03 NOTE — TELEPHONE ENCOUNTER
Caller: SHADAI BIRD    Relationship to patient: Emergency Contact    Best call back number: [phone]     Patient is needing:   PATIENT WENT TO [facility] FOR MRI ON 12/30/23 BUT WAS NOT ABLE TO COMPLETE, THEY SAID PAPERWORK WAS NOT COMPLETED CORRECTLY AND   NO AUTH FOR MRI    PATIENT WAS UNDER THE ASSUMPTION THE MRI WOULD BE FOR NECK, BACK, AND RIGHT SHOULDER/ARM    WOULD LIKE SOME CLARIFICATION    PLEASE CALL TO ADVISE

## 2024-02-12 ENCOUNTER — TELEPHONE (OUTPATIENT)
Dept: NEUROSURGERY | Facility: CLINIC | Age: 79
End: 2024-02-12
Payer: MEDICARE

## 2024-02-13 DIAGNOSIS — M48.02 CERVICAL SPINAL STENOSIS: Primary | ICD-10-CM

## 2024-02-18 DIAGNOSIS — M54.12 CERVICAL RADICULOPATHY: ICD-10-CM

## 2024-02-18 DIAGNOSIS — M48.02 CERVICAL SPINAL STENOSIS: ICD-10-CM

## 2024-02-19 NOTE — TELEPHONE ENCOUNTER
Rx Refill Note  Requested Prescriptions     Pending Prescriptions Disp Refills    gabapentin (NEURONTIN) 600 MG tablet [Pharmacy Med Name: GABAPENTIN 600MG TABLETS] 60 tablet      Sig: TAKE 1 TABLET BY MOUTH TWICE DAILY      Last office visit with prescribing clinician: 11/21/2022   Last telemedicine visit with prescribing clinician: Visit date not found   Next office visit with prescribing clinician: Visit date not found                         Would you like a call back once the refill request has been completed: [] Yes [] No    If the office needs to give you a call back, can they leave a voicemail: [] Yes [] No    Diana Arguelles MA  02/19/24, 08:27 EST

## 2024-02-20 DIAGNOSIS — M54.12 CERVICAL RADICULOPATHY: ICD-10-CM

## 2024-02-20 DIAGNOSIS — M48.02 CERVICAL SPINAL STENOSIS: ICD-10-CM

## 2024-02-20 RX ORDER — GABAPENTIN 600 MG/1
600 TABLET ORAL 2 TIMES DAILY
Qty: 60 TABLET | Refills: 5 | Status: SHIPPED | OUTPATIENT
Start: 2024-02-20

## 2024-02-20 RX ORDER — GABAPENTIN 600 MG/1
600 TABLET ORAL 2 TIMES DAILY
Qty: 60 TABLET | OUTPATIENT
Start: 2024-02-20

## 2024-02-21 ENCOUNTER — TELEPHONE (OUTPATIENT)
Dept: NEUROSURGERY | Facility: CLINIC | Age: 79
End: 2024-02-21
Payer: MEDICARE

## 2024-02-21 NOTE — TELEPHONE ENCOUNTER
CALLER: SUSAN GUTIÉRREZ & SHADIA CUNNINGHAM    IF NEED TO CALL BACK DAUGHTERS W/ QUESTIONS, CALL: [phone] (SHADIA)     PROVIDER: ERICK VOGT    PATIENT ASHISH BIRD'S DAUGHTER CALLED WANTING US TO MAKE SURE THAT WE REQUESTED THE CORRECT MRI TO BE PERFORMED FOR THE PATIENT AT [facility]. THE ORDER IN THE CHART STATES IT IS TO BE W/ AND W/O, BUT THEY WERE TOLD THAT THE ORDER [facility] HAS IS ONLY W/O. PLEASE CALL TO VERIFY WITH [facility] THAT THEY HAVE THE CORRECT ORDER. THE PATIENT WAS SCHEDULED FOR THIS PAST SATURDAY, BUT THEY CANCELED IT DUE TO BEING UNCERTAIN IT WAS THE CORRECT ORDER. THANKS.    CALL [facility], AND S[facility]W LADON[facility]    PHONE # [phone]

## 2024-02-29 ENCOUNTER — OFFICE VISIT (OUTPATIENT)
Dept: NEUROSURGERY | Facility: CLINIC | Age: 79
End: 2024-02-29
Payer: MEDICARE

## 2024-02-29 VITALS — OXYGEN SATURATION: 99 % | SYSTOLIC BLOOD PRESSURE: 120 MMHG | HEART RATE: 66 BPM | DIASTOLIC BLOOD PRESSURE: 80 MMHG

## 2024-02-29 DIAGNOSIS — M54.12 CERVICAL RADICULOPATHY: ICD-10-CM

## 2024-02-29 DIAGNOSIS — M79.601 RIGHT ARM PAIN: ICD-10-CM

## 2024-02-29 DIAGNOSIS — M48.02 CERVICAL SPINAL STENOSIS: Primary | ICD-10-CM

## 2024-02-29 NOTE — PROGRESS NOTES
Subjective   Patient ID: Lemuel Borrego is a 78 y.o. male is here today for follow-up for neck pain. Weakness right arm and hand.     History of Present Illness    Patient was last seen in October 2023 for follow-up of neck and right arm pain with numbness and tingling in the hand.  He also reported a sense of weakness throughout the whole arm and hand.  New cervical MRI imaging was ordered, but unfortunately the patient did not get the MRI.  Physical therapy was also ordered at that visit due to ongoing balance issues that the patient was dealing with, as well as for the right arm pain.    There was some confusion regarding scheduling of the MRI, the patient states that he went a couple times to get it done, but he was unable to do so.  He continues to have the right arm pain with numbness that radiates down into the thumb, index and middle fingers.  The numbness is constant the pain is there to some degree at all times, but worsens with movement and positioning.  He also has significant discomfort in the right side of his neck that radiates to the shoulder.  He feels that the right arm and hand are continuing to get weaker.  He denies any problems on the left.  He denies any balance or gait issues.  He remains on gabapentin 600 mg twice daily.    He presents unaccompanied.      The following portions of the patient's history were reviewed and updated as appropriate: allergies, current medications, past family history, past medical history, past social history, past surgical history, and problem list.    Review of Systems   HENT:  Negative for trouble swallowing.    Genitourinary:  Negative for difficulty urinating and enuresis.   Musculoskeletal:  Positive for gait problem and neck pain. Negative for neck stiffness.   Neurological:  Positive for weakness. Negative for numbness.       /80   Pulse 66   SpO2 99%       Objective     Vitals:    02/29/24 1405   BP: 120/80   Pulse: 66   SpO2: 99%     There is no  height or weight on file to calculate BMI.    Tobacco Use: Low Risk  (2/29/2024)    Patient History     Smoking Tobacco Use: Never     Smokeless Tobacco Use: Never     Passive Exposure: Not on file          Physical Exam  Vitals reviewed.   Constitutional:       Appearance: Normal appearance.   HENT:      Head: Atraumatic.   Pulmonary:      Effort: Pulmonary effort is normal.   Musculoskeletal:         General: Tenderness present.      Comments: Strength equal and intact bilateral upper extremities   full cervical range of motion     Skin:     General: Skin is warm and dry.   Neurological:      Mental Status: He is alert and oriented to person, place, and time.      GCS: GCS eye subscore is 4. GCS verbal subscore is 5. GCS motor subscore is 6.      Sensory: Sensory deficit (Decreased sensation to soft touch right lateral forearm) present.      Motor: Weakness (4/5 weakness with the right thumb flexors, otherwise strength equal and intact bilateral upper extremities) present. No tremor.      Gait: Gait normal.      Deep Tendon Reflexes:      Reflex Scores:       Tricep reflexes are 2+ on the right side and 2+ on the left side.       Bicep reflexes are 2+ on the right side and 2+ on the left side.       Brachioradialis reflexes are 2+ on the right side and 2+ on the left side.     Comments: Gait is stable and upright, but slow and purposeful     Psychiatric:         Mood and Affect: Mood normal.       Neurologic Exam     Mental Status   Oriented to person, place, and time.     Gait, Coordination, and Reflexes     Reflexes   Right brachioradialis: 2+  Left brachioradialis: 2+  Right biceps: 2+  Left biceps: 2+  Right triceps: 2+  Left triceps: 2+          Assessment & Plan   Independent Review of Radiographic Studies:      I personally reviewed the images from the following studies.    No new imaging    Medical Decision Making:      Returns the office today for ongoing follow-up with history of right arm pain with  numbness, tingling and distal weakness.  Exam as noted above, no red flags.  Unfortunately, the patient has not had the cervical MRI that was ordered at his last office visit in October.  He reports that there was some scheduling conflicts preventing him from getting the MRI.  We will ensure that he has an appointment for the cervical MRI before he leaves our office today.  We will go over the findings and treatment options at his next office visit.  Patient is agreeable to this plan.    Plan: Cervical MRI to be scheduled today, patient to follow-up afterwards    Diagnoses and all orders for this visit:    1. Cervical spinal stenosis (Primary)    2. Cervical radiculopathy    3. Right arm pain      Return in about 4 weeks (around 3/28/2024).

## 2024-04-13 ENCOUNTER — HOSPITAL ENCOUNTER (OUTPATIENT)
Dept: MRI IMAGING | Facility: HOSPITAL | Age: 79
Discharge: HOME | End: 2024-04-13
Payer: MEDICARE

## 2024-04-13 DIAGNOSIS — M48.02 CERVICAL SPINAL STENOSIS: ICD-10-CM

## 2024-04-13 PROCEDURE — A9577 INJ MULTIHANCE: HCPCS | Performed by: NURSE PRACTITIONER

## 2024-04-13 PROCEDURE — 72156 MRI NECK SPINE W/O & W/DYE: CPT

## 2024-04-13 PROCEDURE — 0 GADOBENATE DIMEGLUMINE 529 MG/ML SOLUTION: Performed by: NURSE PRACTITIONER

## 2024-04-13 RX ADMIN — GADOBENATE DIMEGLUMINE 20 ML: 529 INJECTION, SOLUTION INTRAVENOUS at 10:49

## 2024-04-17 NOTE — PROGRESS NOTES
Subjective   Patient ID: Lemuel Borrego is a 78 y.o. male is here today for follow-up with MRI cervical spine done on 4/13/24.     History of Present Illness    Patient was last seen in February for complaints of neck and right arm pain with a feeling of right arm weakness, numbness, and tingling that extends to the first 3 digits on his right hand.  He has been going to PT and states that he no longer has pain in his neck or right arm but has some intermittent achiness in his right bicep.  He denies an increase in the weakness, tingling, and numbness in his right arm and fingers.  He denies any issues holding or dropping things but does attest that his handwriting has progressively worsened but cannot recall when he first noticed this.  He does also attest to some gait disturbances he describes as a sway that was present on his prior visit, but denies any change or increasing symptoms in this area. He denies fall. His primary concern this visit is pain and spasm in his left mid lateral back/flank after feeling a pinch a couple of weeks ago. He has not tried any OTC medications or therapies. States that this pain is improved with movement.    He presents unaccompanied    The following portions of the patient's history were reviewed and updated as appropriate: allergies, current medications, past family history, past medical history, past social history, past surgical history, and problem list.    Review of Systems   Gastrointestinal:  Negative for constipation.   Genitourinary:  Negative for difficulty urinating and enuresis.   Musculoskeletal:  Positive for back pain and gait problem.   Neurological:  Positive for weakness. Negative for numbness.   Psychiatric/Behavioral:  Positive for sleep disturbance.        /80   Pulse 73   Wt 95.7 kg (211 lb)   SpO2 100%   BMI 31.14 kg/m²      Objective     Vitals:    04/22/24 1009   BP: 130/80   Pulse: 73   SpO2: 100%   Weight: 95.7 kg (211 lb)     Body mass index  is 31.14 kg/m².    Tobacco Use: Low Risk  (4/22/2024)    Patient History     Smoking Tobacco Use: Never     Smokeless Tobacco Use: Never     Passive Exposure: Not on file          Physical Exam  Vitals and nursing note reviewed.   Constitutional:       Appearance: Normal appearance.   HENT:      Head: Normocephalic and atraumatic.   Pulmonary:      Effort: Pulmonary effort is normal.   Musculoskeletal:         General: Tenderness present. Normal range of motion.      Cervical back: Normal range of motion and neck supple. No tenderness.      Thoracic back: Tenderness present. Normal range of motion.      Comments:   Left-sided paraspinal and flank tenderness to palpation.    Skin:     General: Skin is warm and dry.   Neurological:      Mental Status: He is alert and oriented to person, place, and time.      Gait: Gait abnormal.      Deep Tendon Reflexes:      Reflex Scores:       Tricep reflexes are 2+ on the right side and 2+ on the left side.       Bicep reflexes are 2+ on the right side and 2+ on the left side.       Brachioradialis reflexes are 2+ on the right side and 2+ on the left side.       Patellar reflexes are 2+ on the right side and 2+ on the left side.      Neurologic Exam     Mental Status   Oriented to person, place, and time.   Attention: normal. Concentration: normal.   Knowledge: good.     Motor Exam   Muscle bulk: normal  Overall muscle tone: normal    Strength   Strength 5/5 except as noted. 4/5 R pincer      Sensory Exam   Decreased sensation R arm to hand, including first 3 digits     Gait, Coordination, and Reflexes     Reflexes   Right brachioradialis: 2+  Left brachioradialis: 2+  Right biceps: 2+  Left biceps: 2+  Right triceps: 2+  Left triceps: 2+  Right patellar: 2+  Left patellar: 2+  Right Sanches: absent  Left Sanches: absent  Gait stable and upright but slow           Assessment & Plan   Independent Review of Radiographic Studies:      I personally reviewed the images from the  following studies.    MRI Cervical: Again identified is severe spinal stenosis with cord  compression at C2-3 and the slightly milder degree of spinal stenosis at  C3-4 and then C4-5 and then C5-6. T2 hyperintensity is appreciated  involving the cord at C2-3, C3-4 and C4-5 consistent with areas of  myelomalacia, present previously. Multilevel facet degenerative disease  and foraminal stenosis is also appreciated most prominent of which is at  C2-3 and C3-4 to the right. This appears similar to the prior  examination.     Cervical MRI reviewed and discussed with Dr. Beavers.    Medical Decision Making:      Patient expresses improvement in neck and arm pain with physical therapy.  He does have some tingling and numbness down his right arm extending to his first 3 fingers but states that this has not changed since his last visit.  He has a perceived weakness in the right arm as well but is strong and symmetrical aside from some slight weakness in his right pincer  which was also present on his prior visit. His symptoms are largely unchanged without any concerning myelopathy and MRI cervical appears largely unchanged. Will order home cervical traction kit and have his physical therapist show him how to use this or incorporate cervical traction in outpatient therapies. Will have him follow up as needed.  He was instructed to call office if he has any new or worsening symptoms.  If this does occur will most likely have him follow-up with Dr. Beavers.       His greatest concern for this visit was left flank pain that occurred after feeling a pinch a couple of weeks ago.  He has not tried any over-the-counter medications or any other therapies.  He does have some muscle tightness around his left flank and says that his pain and spasms improved with activity.  Pain is most likely musculoskeletal and he was instructed to treat with over-the-counter remedies, stretching, and heat.  Will also send in short course of  tizanidine.    Diagnoses and all orders for this visit:    1. Cervical spinal stenosis (Primary)  -     Misc. Devices (Cervical Traction) kit; Use 1 Application Daily. Cervical Traction Kit- begin with 4-6 pounds for 20 minutes daily; and increase slowly by 2 pounds every 3 days to max of 10-12 pounds  Dispense: 1 each; Refill: 0  -     Ambulatory Referral to Physical Therapy Evaluate and treat; Stretching, ROM, Strengthening    2. Muscle spasm of back  -     tiZANidine (ZANAFLEX) 2 MG tablet; Take 1 tablet by mouth Every 6 (Six) Hours As Needed for Muscle Spasms.  Dispense: 30 tablet; Refill: 0    3. Cervical radiculopathy  -     Misc. Devices (Cervical Traction) kit; Use 1 Application Daily. Cervical Traction Kit- begin with 4-6 pounds for 20 minutes daily; and increase slowly by 2 pounds every 3 days to max of 10-12 pounds  Dispense: 1 each; Refill: 0  -     Ambulatory Referral to Physical Therapy Evaluate and treat; Stretching, ROM, Strengthening      Return if symptoms worsen or fail to improve.

## 2024-04-22 ENCOUNTER — OFFICE VISIT (OUTPATIENT)
Dept: NEUROSURGERY | Facility: CLINIC | Age: 79
End: 2024-04-22
Payer: MEDICARE

## 2024-04-22 VITALS
DIASTOLIC BLOOD PRESSURE: 80 MMHG | HEART RATE: 73 BPM | SYSTOLIC BLOOD PRESSURE: 130 MMHG | BODY MASS INDEX: 31.14 KG/M2 | WEIGHT: 211 LBS | OXYGEN SATURATION: 100 %

## 2024-04-22 DIAGNOSIS — M48.02 CERVICAL SPINAL STENOSIS: Primary | ICD-10-CM

## 2024-04-22 DIAGNOSIS — M62.830 MUSCLE SPASM OF BACK: ICD-10-CM

## 2024-04-22 DIAGNOSIS — M54.12 CERVICAL RADICULOPATHY: ICD-10-CM

## 2024-04-22 PROCEDURE — 1159F MED LIST DOCD IN RCRD: CPT

## 2024-04-22 PROCEDURE — 1160F RVW MEDS BY RX/DR IN RCRD: CPT

## 2024-04-22 PROCEDURE — 99214 OFFICE O/P EST MOD 30 MIN: CPT

## 2024-04-22 RX ORDER — TIZANIDINE 2 MG/1
2 TABLET ORAL EVERY 6 HOURS PRN
Qty: 30 TABLET | Refills: 0 | Status: SHIPPED | OUTPATIENT
Start: 2024-04-22 | End: 2024-04-22

## 2024-04-22 RX ORDER — CYCLOBENZAPRINE HCL 5 MG
5 TABLET ORAL 3 TIMES DAILY PRN
Qty: 30 TABLET | Refills: 0 | Status: SHIPPED | OUTPATIENT
Start: 2024-04-22

## 2024-06-10 ENCOUNTER — TELEPHONE (OUTPATIENT)
Dept: NEUROSURGERY | Facility: CLINIC | Age: 79
End: 2024-06-10

## 2024-06-10 NOTE — TELEPHONE ENCOUNTER
Caller: ASHISH BIRD    Relationship: SELF    Best call back number: [phone]    What was the call regarding: PATIENT CALLED WANTING TO KNOW IF NEEDS TO SCHEDULED TO [facility] CAN THEY JUST BE GIVEN OVER THE PHONE    PLEASE CALL PATIENT AND ADVISE  THANK YOU

## 2024-09-09 NOTE — PROGRESS NOTES
"Subjective   Patient ID: Lemuel Borrego is a 79 y.o. male is here today for follow-up for   -Cervical spinal stenosis   No new imaging preformed.    PT prog notes in chart.      History of Present Illness   79-year-old male initially evaluated in February 2024 for neck and right arm pain with right arm weakness, numbness and tingling extending into the first 3 digits of the right hand.  He was again evaluated in April 2024 and notes that he no longer had pain in his neck, right arm but had some intermittent achiness in the right bicep.  He denied any increase in weakness, numbness or tingling in the right arm and fingers.  He does note a history of gait disturbance described as a sway but denies any significant change or increasing symptoms or fall.  At the most recent visit in April, he was most concerned about his left back/flank area and had experienced a \"pinch\" a couple weeks prior.  He tried over-the-counter medications and overall noted improvement with movement.  His bilateral upper extremity strength was strong and symmetrical with mainly subjective weakness with only objective weakness noted with the right hand pincer grasp slightly weakened.  He was to continue physical therapy and start cervical traction and follow-up as needed.  If he would require further follow-up there was plan for him to see Dr. Beavers.  Today he notes continued pain in the neck and arms.  He is out of gabapentin today.  He denies any worsening weakness in his arms.  He does report some subjective weakness in the right pincer grasp but overall his bilateral upper extremity strength is strong and fairly equal.  He also reports some numbness in his right hand at baseline for \"years\".  He does not report any new or worsening bowel or bladder issues, saddle anesthesia.  He notes no new significant changes with gait.  He continues to report \"swaying\" but he has experienced the symptoms since tashia COVID a while " "back.          The following portions of the patient's history were reviewed and updated as appropriate: allergies, current medications, past family history, past medical history, past social history, past surgical history, and problem list.    Review of Systems   Gastrointestinal:         Denies bowel issues   Genitourinary:  Negative for enuresis.   Musculoskeletal:  Positive for gait problem (\"Swaying\".  Unchanged.  Experience this symptom ever since having COVID a while back.).   Neurological:  Positive for weakness (Baseline, no changes) and numbness (At baseline and right hand for \"years\").           Objective     Vitals:    09/12/24 0913   BP: 140/70   Pulse: 71   Temp: 97.1 °F (36.2 °C)   SpO2: 98%   Weight: 92.1 kg (203 lb)   Height: 175 cm (68.9\")     Body mass index is 30.07 kg/m².    Tobacco Use: Low Risk  (9/12/2024)    Patient History     Smoking Tobacco Use: Never     Smokeless Tobacco Use: Never     Passive Exposure: Not on file          Physical Exam  Constitutional:       General: He is not in acute distress.     Appearance: Normal appearance. He is not ill-appearing, toxic-appearing or diaphoretic.   HENT:      Head: Normocephalic.      Nose: Nose normal.      Mouth/Throat:      Mouth: Mucous membranes are moist.      Pharynx: Oropharynx is clear.   Eyes:      Conjunctiva/sclera: Conjunctivae normal.   Cardiovascular:      Rate and Rhythm: Normal rate.   Pulmonary:      Effort: Pulmonary effort is normal.   Musculoskeletal:         General: Normal range of motion.      Cervical back: Normal range of motion.   Skin:     General: Skin is warm.   Neurological:      Mental Status: He is oriented to person, place, and time. Mental status is at baseline.      Deep Tendon Reflexes:      Reflex Scores:       Tricep reflexes are 2+ on the right side and 2+ on the left side.       Bicep reflexes are 2+ on the right side and 2+ on the left side.       Brachioradialis reflexes are 2+ on the right side and 2+ " on the left side.  Psychiatric:         Mood and Affect: Mood normal.         Behavior: Behavior normal.         Thought Content: Thought content normal.         Judgment: Judgment normal.     Neurological Exam  Mental Status  Awake, alert and oriented to person, place and time. Oriented to person, place and time. Oriented to person, place, and time.    Motor  Normal muscle bulk throughout. Normal muscle tone.                                               Right                     Left  Neck flexion                           5                          5  Neck extension                      5                          5   Shoulder abduction               5                          5   Shoulder adduction               5                          5  Elbow flexion                         5                          5  Elbow extension                    5                          5  Wrist flexion                           5                          5  Wrist extension                      5                          5  Finger flexion                         5                          5  Finger extension                    5                          5  BUE strength fairly equal.  Notes some subjective weakness with right hand grasp although almost to left hand.  Notes this has been an issue for some time.  Seems mildly improved from last visit..    Sensory  Numbness in right hand at baseline for years per patient.    Reflexes                                            Right                      Left  Brachioradialis                    2+                         2+  Biceps                                 2+                         2+  Triceps                                2+                         2+    Right pathological reflexes: Eduarda's absent. Ankle clonus absent.  Left pathological reflexes: Eduarda's absent. Ankle clonus absent.    Gait    Overall stable gait..          Assessment & Plan   Independent Review of  Radiographic Studies:      No new neuroimaging    Medical Decision Making:      This is a 79-year-old male initially evaluated in February of this year for neck and right arm pain, weakness and numbness.  He was reevaluated in April and noted improvement in neck, right arm pain but still had some intermittent achiness in the right biceps area.  He has been managing his pain with Tylenol and gabapentin.  He had recently also been dealing with left back and flank pain at the last visit but today is not having any significant issues with that.  Today he continues to have neck and right arm pain.  He is currently out of gabapentin and we will get this refilled today.  I discussed the risks and benefits of a cervical epidural steroid injection with risks including but not limited to bleeding, infection, nerve damage, CSF leak, coma, seizure and death.  The patient feels that he does not want to pursue an epidural steroid injection at this time and of course avoid surgery in the future.  He will continue with his gabapentin dosage and notify us for any worsening issues such as any new or worsening upper or lower extremity pain, numbness, tingling, weakness, saddle anesthesia, bowel or bladder dysfunction or gait instability.  He will also let us know if he changes his mind and is desiring to obtain a cervical epidural steroid injection.  We will have him follow-up in 3 to 4 weeks from now for a clinical check with one of our AP[facility].  I would like him to get in with Dr. Beavers but unfortunately he does not have availability until January.  We will have him see one of our APCs on a day when he is in the office      Diagnoses and all orders for this visit:    1. Cervical radiculopathy (Primary)    2. Cervical spinal stenosis      Return In 3 to 4 weeks from now with APC.

## 2024-09-12 ENCOUNTER — OFFICE VISIT (OUTPATIENT)
Dept: NEUROSURGERY | Facility: CLINIC | Age: 79
End: 2024-09-12
Payer: MEDICARE

## 2024-09-12 VITALS
WEIGHT: 203 LBS | BODY MASS INDEX: 30.07 KG/M2 | OXYGEN SATURATION: 98 % | SYSTOLIC BLOOD PRESSURE: 140 MMHG | HEART RATE: 71 BPM | DIASTOLIC BLOOD PRESSURE: 70 MMHG | TEMPERATURE: 97.1 F | HEIGHT: 69 IN

## 2024-09-12 DIAGNOSIS — M54.12 CERVICAL RADICULOPATHY: Primary | ICD-10-CM

## 2024-09-12 DIAGNOSIS — M54.12 CERVICAL RADICULOPATHY: ICD-10-CM

## 2024-09-12 DIAGNOSIS — M48.02 CERVICAL SPINAL STENOSIS: ICD-10-CM

## 2024-09-12 PROCEDURE — 1159F MED LIST DOCD IN RCRD: CPT

## 2024-09-12 PROCEDURE — 1160F RVW MEDS BY RX/DR IN RCRD: CPT

## 2024-09-12 PROCEDURE — 99213 OFFICE O/P EST LOW 20 MIN: CPT

## 2024-09-12 RX ORDER — GABAPENTIN 600 MG/1
600 TABLET ORAL 2 TIMES DAILY
Qty: 60 TABLET | Refills: 3 | Status: SHIPPED | OUTPATIENT
Start: 2024-09-12

## 2024-09-13 RX ORDER — GABAPENTIN 600 MG/1
600 TABLET ORAL 2 TIMES DAILY
Qty: 60 TABLET | Refills: 5 | Status: SHIPPED | OUTPATIENT
Start: 2024-09-13

## 2024-09-13 NOTE — TELEPHONE ENCOUNTER
Rx Refill Note  Requested Prescriptions     Pending Prescriptions Disp Refills    gabapentin (NEURONTIN) 600 MG tablet [Pharmacy Med Name: GABAPENTIN 600MG TABLETS] 60 tablet      Sig: TAKE 1 TABLET BY MOUTH TWICE DAILY      Last office visit with prescribing clinician: 11/21/2022   Last telemedicine visit with prescribing clinician: Visit date not found   Next office visit with prescribing clinician: 1/22/2025                         Would you like a call back once the refill request has been completed: [] Yes [] No    If the office needs to give you a call back, can they leave a voicemail: [] Yes [] No    Diana Arguelles MA  09/13/24, 08:25 EDT

## 2024-10-09 NOTE — PROGRESS NOTES
"Subjective   Patient ID: Lemuel Borrego is a 79 y.o. male is here today for follow-up for F/U 4 WEEKS RIGHT ARM / NECK PAIN - PER SHANIA JAUREGUI WOULD ALSO LIKE FOR  DR. ALMEIDA TO SEE THE PATIENT DURING HIS APPOINTMENT WITH ERICK VOGT.    History of Present Illness    Patient was last seen in the office 1 month ago for ongoing follow-up with history of neck and right arm pain.  He is on gabapentin and was not interested in moving forward with cervical epidural injections.    The patient states that he is walking as if he was drunk.  His gait is unstable and he is very scared of falling.  He denies any falls.  He also continues to have severe pain in the right side of his neck that radiates down the right arm into the thumb.  He is starting to feel weaker in the right lower arm.  He is still adamantly opposed to cervical epidural injections.  However, he also knows that something needs to be done.  He is ready to move forward with a surgical discussion.  He denies any changes to his bowel or bladder function, denies any saddle paresthesias.     He is doing physical therapy, which has helped with some of the muscle tightness and discomfort in his neck and shoulders.    He presents unaccompanied.        The following portions of the patient's history were reviewed and updated as appropriate: allergies, current medications, past family history, past medical history, past social history, past surgical history, and problem list.    Review of Systems   Musculoskeletal:  Positive for neck pain and neck stiffness.   All other systems reviewed and are negative.      /70   Pulse 73   Temp 98 °F (36.7 °C)   Ht 175 cm (68.9\")   Wt 91.2 kg (201 lb)   SpO2 99%   BMI 29.77 kg/m²       Objective     Vitals:    10/14/24 1436   BP: 150/70   Pulse: 73   Temp: 98 °F (36.7 °C)   SpO2: 99%   Weight: 91.2 kg (201 lb)   Height: 175 cm (68.9\")     Body mass index is 29.77 kg/m².      Physical Exam  Vitals reviewed. "   Constitutional:       Appearance: Normal appearance.   HENT:      Head: Atraumatic.   Pulmonary:      Effort: Pulmonary effort is normal.   Musculoskeletal:         General: Tenderness (Very tender in the right lateral cervical region, also tender in the right shoulder and parascapular region) present.      Comments: Decreased cervical range of motion with right and left lateral rotation   Skin:     General: Skin is warm and dry.   Neurological:      Mental Status: He is alert and oriented to person, place, and time.      GCS: GCS eye subscore is 4. GCS verbal subscore is 5. GCS motor subscore is 6.      Sensory: No sensory deficit.      Motor: Weakness (Right  and thumb flexor weakness) present. No tremor.      Gait: Gait abnormal (Gait is slow and wide-based).      Deep Tendon Reflexes:      Reflex Scores:       Tricep reflexes are 2+ on the right side and 2+ on the left side.       Bicep reflexes are 2+ on the right side and 2+ on the left side.       Brachioradialis reflexes are 2+ on the right side and 2+ on the left side.     Comments: Negative Eduarda's, positive Spurling's to the right   normal sensation to soft touch bilateral upper extremities     Psychiatric:         Mood and Affect: Mood normal.       Neurological Exam  Mental Status  Alert. Oriented to person, place, and time.    Reflexes                                            Right                      Left  Brachioradialis                    2+                         2+  Biceps                                 2+                         2+  Triceps                                2+                         2+    Coordination  No tremor    Gait   Abnormal gait (Gait is slow and wide-based).          Assessment & Plan   Independent Review of Radiographic Studies:      I personally reviewed the images from the following studies.    Cervical MRI with and without contrast from April 2024 reveals severe spinal stenosis with cord compression at  C2-3 and slightly milder degrees of spinal stenosis at C3-4 C4-5 and C5-6.  There is T2 hyperintensity appreciate involving the cord at C2-3, C3-4 and C4-5 consistent with areas of myelomalacia also present previously.  He has multilevel facet degenerative and foraminal stenosis most prominent at C2-3 and C3-4 to the right.    Medical Decision Making:    He presents office today for ongoing follow-up with history of severe cervical spinal and foraminal stenosis.  Exam as noted above, red flags concerning for right arm weakness and neurogenic claudication.  I am concerned that the stenosis in his cervical spine is contributing to his balance and gait issues.  He states that he continues to feel that his gait is worsening and he feels as if he is drunk.  Sometimes he has difficulty controlling his legs.  His reflexes are intact.  He does have trace weakness in the right upper extremity distally.  The patient was previously being seen by Dr Beavers, but given his exam findings and imaging findings, I have asked if Dr Martinez  would see him, and he is agreeable to do so.  The patient will follow-up Dr Martinez this Friday with new cervical imaging.    Plan: Cervical x-rays, follow-up with Dr Martinez on Friday for surgical discussion      Diagnoses and all orders for this visit:    1. Cervical spinal stenosis (Primary)    2. Right arm pain    3. Gait instability      Return for Follow up as scheduled with Dr Martinez.

## 2024-10-14 ENCOUNTER — OFFICE VISIT (OUTPATIENT)
Dept: NEUROSURGERY | Facility: CLINIC | Age: 79
End: 2024-10-14
Payer: MEDICARE

## 2024-10-14 VITALS
HEIGHT: 69 IN | WEIGHT: 201 LBS | TEMPERATURE: 98 F | HEART RATE: 73 BPM | OXYGEN SATURATION: 99 % | SYSTOLIC BLOOD PRESSURE: 150 MMHG | BODY MASS INDEX: 29.77 KG/M2 | DIASTOLIC BLOOD PRESSURE: 70 MMHG

## 2024-10-14 DIAGNOSIS — M48.02 CERVICAL SPINAL STENOSIS: Primary | ICD-10-CM

## 2024-10-14 DIAGNOSIS — M79.601 RIGHT ARM PAIN: ICD-10-CM

## 2024-10-14 DIAGNOSIS — R26.81 GAIT INSTABILITY: ICD-10-CM

## 2024-10-14 PROCEDURE — 1160F RVW MEDS BY RX/DR IN RCRD: CPT | Performed by: NURSE PRACTITIONER

## 2024-10-14 PROCEDURE — 99213 OFFICE O/P EST LOW 20 MIN: CPT | Performed by: NURSE PRACTITIONER

## 2024-10-14 PROCEDURE — 1159F MED LIST DOCD IN RCRD: CPT | Performed by: NURSE PRACTITIONER

## 2024-10-18 ENCOUNTER — OFFICE VISIT (OUTPATIENT)
Dept: NEUROSURGERY | Facility: CLINIC | Age: 79
End: 2024-10-18
Payer: MEDICARE

## 2024-10-18 VITALS
OXYGEN SATURATION: 99 % | TEMPERATURE: 97 F | SYSTOLIC BLOOD PRESSURE: 138 MMHG | DIASTOLIC BLOOD PRESSURE: 76 MMHG | HEIGHT: 69 IN | BODY MASS INDEX: 29.95 KG/M2 | RESPIRATION RATE: 16 BRPM | WEIGHT: 202.2 LBS | HEART RATE: 69 BPM

## 2024-10-18 DIAGNOSIS — M47.12 CERVICAL SPONDYLOSIS WITH MYELOPATHY: Primary | ICD-10-CM

## 2024-10-18 DIAGNOSIS — M48.062 SPINAL STENOSIS, LUMBAR REGION, WITH NEUROGENIC CLAUDICATION: ICD-10-CM

## 2024-10-18 NOTE — PROGRESS NOTES
Patient ID: Lemuel Borrego is a 79 y.o. male is here today for follow-up for neck pain.    Imaging: MRI of the cervical spine completed on 04/13/2024    Subjective     The patient is here in regards to   Chief Complaint   Patient presents with    Neck Pain    Follow-up       History of Present Illness  Lemuel describes worsening balance issues since he was hospitalized with COVID for 3 days this past Thanksgiving holiday.  He feels that he is lost a significant amount of muscle mass during that time he has not been able to regain his balance.  He denies any significant neck or low back pain.  He also has difficulty walking for long periods of time before his legs get tired and he has to rest.      While in the room and during my examination of the patient I wore a mask and eye protection.  I washed my hands before and after this patient encounter.  The patient was also wearing a mask.    The following portions of the patient's history were reviewed and updated as appropriate: allergies, current medications, past family history, past medical history, past social history, past surgical history and problem list.    Review of Systems   Constitutional:  Negative for fever.   Musculoskeletal:  Positive for neck pain and neck stiffness.   Neurological:  Positive for dizziness, weakness, light-headedness and headaches. Negative for numbness.        Past Medical History:   Diagnosis Date    Diabetes     Hypertelorism        No Known Allergies    Family History   Problem Relation Age of Onset    Diabetes Mother     Heart disease Mother     Heart disease Father     Diabetes Sister     Diabetes Brother        Social History     Socioeconomic History    Marital status: Single   Tobacco Use    Smoking status: Never    Smokeless tobacco: Never   Vaping Use    Vaping status: Never Used   Substance and Sexual Activity    Alcohol use: Yes    Drug use: No    Sexual activity: Defer       History reviewed. No pertinent surgical  history.      Objective     Vitals:    10/18/24 1135   BP: 138/76   Pulse: 69   Resp: 16   Temp: 97 °F (36.1 °C)   SpO2: 99%     Body mass index is 29.86 kg/m².    Physical Exam  Constitutional:       General: He is awake.      Appearance: Normal appearance.   HENT:      Head: Normocephalic and atraumatic.   Eyes:      General: Lids are normal.      Extraocular Movements: Extraocular movements intact.      Conjunctiva/sclera: Conjunctivae normal.      Pupils: Pupils are equal, round, and reactive to light.   Cardiovascular:      Rate and Rhythm: Normal rate and regular rhythm.      Pulses: Normal pulses.   Pulmonary:      Breath sounds: Normal breath sounds.   Abdominal:      Palpations: Abdomen is soft.   Musculoskeletal:         General: Normal range of motion.      Cervical back: Normal range of motion and neck supple.   Skin:     General: Skin is warm and dry.   Neurological:      Mental Status: He is alert and oriented to person, place, and time.      Motor: Motor function is intact. No weakness or atrophy.      Coordination: Romberg sign positive.      Gait: Gait normal.      Deep Tendon Reflexes: Reflexes are normal and symmetric.      Reflex Scores:       Tricep reflexes are 2+ on the right side and 2+ on the left side.       Bicep reflexes are 2+ on the right side and 2+ on the left side.       Brachioradialis reflexes are 2+ on the right side and 2+ on the left side.       Patellar reflexes are 2+ on the right side and 2+ on the left side.       Achilles reflexes are 2+ on the right side and 2+ on the left side.        Neurological Exam  Mental Status  Awake and alert. Oriented to person, place and time. Oriented to person, place, and time.    Cranial Nerves  CN II: Visual acuity is normal. Visual fields full to confrontation.  CN III, IV, VI: Extraocular movements intact bilaterally. Normal lids and orbits bilaterally. Pupils equal round and reactive to light bilaterally.  CN V: Facial sensation is  normal.  CN VII: Full and symmetric facial movement.  CN IX, X: Palate elevates symmetrically. Normal gag reflex.  CN XI: Shoulder shrug strength is normal.  CN XII: Tongue midline without atrophy or fasciculations.    Motor                                               Right                     Left  Deltoid                                   5                          5   Biceps                                   5                          5   Iliopsoas                               5                          5   Quadriceps                           5                          5   Hamstring                             5                          5   Gastrocnemius                     5                           5   Anterior tibialis                      5                          5    Sensory  Sensation is intact to light touch, pinprick, vibration and proprioception in all four extremities.    Reflexes  Deep tendon reflexes are 2+ and symmetric in all four extremities.                                            Right                      Left  Brachioradialis                    2+                         2+  Biceps                                 2+                         2+  Triceps                                2+                         2+  Patellar                                2+                         2+  Achilles                                2+                         2+    Gait   Normal gait.Casual gait: Wide stance. Hesitant gait. Romberg is present.      Assessment & Plan   Independent Review of Radiographic Studies:      I personally reviewed the images from the following studies.    FINDINGS:    MR: MRI of the cervical spine wo contrast was reviewed and shows severe degenerative disc disease at C3-4, C4-5, C5-6 with central stenosis resulting in loss of CSF signal.  There is also multiple levels of T2 cord signal change around these stenosis that may indicate myelomalacia.  There is thickening of the  anterior longitudinal ligament that may represent OPLL.    Assessment/Plan: Has severe cervical stenosis but his reflexes are relatively normal he has no Sanches's sign.  Only significant finding of myelopathy is very poor balance and positive Romberg sign.  He has also complained of symptoms of tiredness in his legs and I think that it is possible that he has neurogenic claudication but all of his symptoms may be explained by general deconditioning.  The strange thing is that his MRI of the cervical spine shows severe stenosis with cord signal change but does not have reflexes.  He continues to work with physical therapy notices some benefit.  I would like to obtain an MRI of the lumbar spine to rule out neurogenic claudication as a cause of his poor balance and he returns to clinic we may discuss a cervical laminectomy to decompress his cervical spine.    Medical Decision Making:      CT cervical spine  MRI lumbar spine without contrast         Diagnoses and all orders for this visit:    1. Cervical spondylosis with myelopathy (Primary)  -     CT Cervical Spine Without Contrast; Future    2. Spinal stenosis, lumbar region, with neurogenic claudication  -     MRI Lumbar Spine Without Contrast; Future             Patient Instructions/Recommendations:    Follow-up after imaging completed      Edgar Martinez MD  10/18/24  12:03 EDT

## 2024-11-20 ENCOUNTER — HOSPITAL ENCOUNTER (OUTPATIENT)
Dept: CT IMAGING | Facility: HOSPITAL | Age: 79
Discharge: HOME | End: 2024-11-20
Payer: MEDICARE

## 2024-11-20 ENCOUNTER — HOSPITAL ENCOUNTER (OUTPATIENT)
Dept: MRI IMAGING | Facility: HOSPITAL | Age: 79
Discharge: HOME | End: 2024-11-20
Payer: MEDICARE

## 2024-11-20 DIAGNOSIS — M47.12 CERVICAL SPONDYLOSIS WITH MYELOPATHY: ICD-10-CM

## 2024-11-20 DIAGNOSIS — M48.062 SPINAL STENOSIS, LUMBAR REGION, WITH NEUROGENIC CLAUDICATION: ICD-10-CM

## 2024-11-20 PROCEDURE — 72125 CT NECK SPINE W/O DYE: CPT

## 2024-11-20 PROCEDURE — 72148 MRI LUMBAR SPINE W/O DYE: CPT

## 2024-12-10 ENCOUNTER — TELEPHONE (OUTPATIENT)
Dept: NEUROSURGERY | Facility: CLINIC | Age: 79
End: 2024-12-10
Payer: MEDICARE

## 2024-12-10 NOTE — TELEPHONE ENCOUNTER
Called patient left VM. Asked patient to call office back in regards to his 12/26/2024 appt. We need to reschedule. Informed patient we have either 12/24 or 12/31 available for rescheduling.

## 2024-12-11 ENCOUNTER — TELEPHONE (OUTPATIENT)
Dept: NEUROSURGERY | Facility: CLINIC | Age: 79
End: 2024-12-11

## 2024-12-11 NOTE — TELEPHONE ENCOUNTER
PATIENT CALLED BACK IN - ATTEMPTED WT AND NOT SUCCESSFUL     PLEASE CALL PATIENT TO GET RESCHEDULED     [phone] (home)     THANK YOU!

## 2024-12-11 NOTE — TELEPHONE ENCOUNTER
Hub staff attempted to follow warm transfer process and was unsuccessful     Caller: ASHISH    Relationship to patient: SELF    Best call back number:     [phone]    OR /    [phone]     Patient is needing: PATIENT NEEDS TO RESCHEDULE DR MOREIRA APPT PER MESSAGE IN CHART- PATIENT WOULD LIKE THE 24TH

## 2024-12-23 NOTE — PROGRESS NOTES
Patient ID: Lemuel Borrego is a 79 y.o. male is here today for follow-up cervical spondylolisthesis with myelopathy and lumbar spinal stenosis.    Imaging: CT of cervical spine and MRI of the lumbar spine completed on 11/20/2024    Subjective     The patient is here in regards to   Chief Complaint   Patient presents with    Neck Pain    Back Pain    Follow-up       History of Present Illness  Blood sugar presents with several year history of neurogenic claudication symptoms as well as several month history of worsening myelopathy.  He notes that he has a significant gait issue with balance issues.  He also has significant neck pain and stiffness as well as burning sensation in his legs when he ambulates.  He also complains of back tightness that is somewhat relieved with leaning forward occasionally.  He was being followed by Dr. Beavers and recently seen by Carine Marcum.  Concerns for myomalacia findings on his MRI of his cervical spine prompted a earlier follow-up with me.      While in the room and during my examination of the patient I wore a mask and eye protection.  I washed my hands before and after this patient encounter.  The patient was also wearing a mask.    The following portions of the patient's history were reviewed and updated as appropriate: allergies, current medications, past family history, past medical history, past social history, past surgical history and problem list.    Review of Systems   Constitutional:  Negative for fever.   Musculoskeletal:  Positive for back pain, gait problem, neck pain and neck stiffness.   Neurological:  Positive for weakness, light-headedness and numbness. Negative for dizziness and headaches.        Past Medical History:   Diagnosis Date    Diabetes     Hypertelorism        No Known Allergies    Family History   Problem Relation Age of Onset    Diabetes Mother     Heart disease Mother     Heart disease Father     Diabetes Sister     Diabetes Brother        Social  History     Socioeconomic History    Marital status: Single   Tobacco Use    Smoking status: Never    Smokeless tobacco: Never   Vaping Use    Vaping status: Never Used   Substance and Sexual Activity    Alcohol use: Yes    Drug use: No    Sexual activity: Defer       History reviewed. No pertinent surgical history.      Objective     Vitals:    12/24/24 0948   BP: 112/56   Pulse: 86   Resp: 16   Temp: 97.3 °F (36.3 °C)   SpO2: 98%     Body mass index is 29.73 kg/m².    Physical Exam  Constitutional:       General: He is awake.      Appearance: Normal appearance.   HENT:      Head: Normocephalic and atraumatic.   Eyes:      General: Lids are normal.      Extraocular Movements: Extraocular movements intact.      Conjunctiva/sclera: Conjunctivae normal.      Pupils: Pupils are equal, round, and reactive to light.   Cardiovascular:      Rate and Rhythm: Normal rate and regular rhythm.      Pulses: Normal pulses.   Pulmonary:      Breath sounds: Normal breath sounds.   Abdominal:      Palpations: Abdomen is soft.   Musculoskeletal:         General: Normal range of motion.      Cervical back: Normal range of motion and neck supple.   Skin:     General: Skin is warm and dry.   Neurological:      Mental Status: He is alert and oriented to person, place, and time.      Motor: Motor function is intact. No weakness or atrophy.      Coordination: Coordination is intact. Romberg sign positive.      Gait: Gait normal.      Deep Tendon Reflexes: Reflexes are normal and symmetric.      Reflex Scores:       Tricep reflexes are 2+ on the right side and 2+ on the left side.       Bicep reflexes are 2+ on the right side and 2+ on the left side.       Brachioradialis reflexes are 2+ on the right side and 2+ on the left side.       Patellar reflexes are 2+ on the right side and 2+ on the left side.       Achilles reflexes are 2+ on the right side and 2+ on the left side.        Neurological Exam  Mental Status  Awake and alert. Oriented  to person, place and time. Oriented to person, place, and time.    Cranial Nerves  CN II: Visual acuity is normal. Visual fields full to confrontation.  CN III, IV, VI: Extraocular movements intact bilaterally. Normal lids and orbits bilaterally. Pupils equal round and reactive to light bilaterally.  CN V: Facial sensation is normal.  CN VII: Full and symmetric facial movement.  CN IX, X: Palate elevates symmetrically. Normal gag reflex.  CN XI: Shoulder shrug strength is normal.  CN XII: Tongue midline without atrophy or fasciculations.    Motor  Decreased muscle bulk throughout.                                               Right                     Left  Deltoid                                   4-                          4+   Biceps                                   4+                          4+   Triceps                                  4                          4   Iliopsoas                               4+                          4+   Quadriceps                           4+                          4+   Hamstring                             4+                          4+   Gastrocnemius                     5                           5   Anterior tibialis                      5                          5    Sensory  Sensation is intact to light touch, pinprick, vibration and proprioception in all four extremities.    Reflexes  Deep tendon reflexes are 2+ and symmetric in all four extremities.                                            Right                      Left  Brachioradialis                    2+                         2+  Biceps                                 2+                         2+  Triceps                                2+                         2+  Patellar                                2+                         2+  Achilles                                2+                         2+    Right pathological reflexes: Eduarda's absent.  Left pathological reflexes: Eduarda's  absent.    Coordination    Finger-to-nose, rapid alternating movements and heel-to-shin normal bilaterally without dysmetria.    Gait   Normal gait.Casual gait: Narrow stance. Hesitant gait. Romberg is present.      Assessment & Plan   Independent Review of Radiographic Studies:      I personally reviewed the images from the following studies.    FINDINGS:    CT: CT of the cervical spine was reviewed and shows no evidence of OPLL  MR: MRI of the cervical spine wo contrast was reviewed and shows multiple levels of severe stenosis at the C2-3, C3-4, C4-5 level with evidence of mild malacia just posterior to the C3-4 level disc bulge.  This broad-based disc bulge results in bilateral foraminal as well as central stenosis which seems to be large at the C2-3 level.  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  There is narrowing due to posterior ligamentous hypertrophy at the L2-3 level and L3-4 level and a broad-based disc bulge at L4-5 in conjunction with posterior ligamentous hypertrophy at the L4-5 level.  This results in loss of CSF signal at the L2-3 and L4-5 and severe stenosis at L3-4    Assessment/Plan: Has symptoms of cervical myelopathy with decrease in generalized strength throughout with some particularly decrease strength in the right upper extremity.  He has poor balance and gait although seems to have normal reflexes and no evidence of Eduarda sign bilaterally.  He does have geographical evidence of spinal cord injury and myomalacia at the C3-4 level.  I recommended a cervical laminectomy for his myelopathy as well as a lumbar laminectomy for his neurogenic claudication symptoms.    He agrees with this assessment but does not feel comfortable with me as a surgeon because of my young age.  I reassured him that I felt comfortable performing a  cervical and/or lumbar laminectomy and felt that these procedures were well within my abilities but he feels more comfortable with Dr. Beavers or an older surgeon.    Medical Decision Making:      Recommend cervical laminectomy and lumbar laminectomy         Diagnoses and all orders for this visit:    1. Cervical spondylosis with myelopathy (Primary)    2. Spinal stenosis, lumbar region, with neurogenic claudication             Patient Instructions/Recommendations:    Follow-up with Karli Desir or Dr. Nimesh Martinez MD  12/24/24  10:42 EST

## 2024-12-24 ENCOUNTER — OFFICE VISIT (OUTPATIENT)
Dept: NEUROSURGERY | Facility: CLINIC | Age: 79
End: 2024-12-24
Payer: MEDICARE

## 2024-12-24 VITALS
BODY MASS INDEX: 29.82 KG/M2 | SYSTOLIC BLOOD PRESSURE: 112 MMHG | WEIGHT: 201.3 LBS | RESPIRATION RATE: 16 BRPM | DIASTOLIC BLOOD PRESSURE: 56 MMHG | HEART RATE: 86 BPM | TEMPERATURE: 97.3 F | HEIGHT: 69 IN | OXYGEN SATURATION: 98 %

## 2024-12-24 VITALS — OXYGEN SATURATION: 97 % | HEART RATE: 70 BPM | DIASTOLIC BLOOD PRESSURE: 68 MMHG | SYSTOLIC BLOOD PRESSURE: 120 MMHG

## 2024-12-24 DIAGNOSIS — R26.89 IMBALANCE: ICD-10-CM

## 2024-12-24 DIAGNOSIS — M48.02 SPINAL STENOSIS IN CERVICAL REGION: ICD-10-CM

## 2024-12-24 DIAGNOSIS — R26.81 GAIT INSTABILITY: ICD-10-CM

## 2024-12-24 DIAGNOSIS — M48.062 SPINAL STENOSIS OF LUMBAR REGION WITH NEUROGENIC CLAUDICATION: ICD-10-CM

## 2024-12-24 DIAGNOSIS — M48.062 SPINAL STENOSIS, LUMBAR REGION, WITH NEUROGENIC CLAUDICATION: ICD-10-CM

## 2024-12-24 DIAGNOSIS — M47.12 CERVICAL SPONDYLOSIS WITH MYELOPATHY: Primary | ICD-10-CM

## 2024-12-24 PROCEDURE — 99214 OFFICE O/P EST MOD 30 MIN: CPT | Performed by: NEUROLOGICAL SURGERY

## 2024-12-24 PROCEDURE — 1159F MED LIST DOCD IN RCRD: CPT | Performed by: NEUROLOGICAL SURGERY

## 2024-12-24 PROCEDURE — 1160F RVW MEDS BY RX/DR IN RCRD: CPT | Performed by: NEUROLOGICAL SURGERY

## 2024-12-24 RX ORDER — DORZOLAMIDE HYDROCHLORIDE AND TIMOLOL MALEATE 20; 5 MG/ML; MG/ML
1 SOLUTION/ DROPS OPHTHALMIC 2 TIMES DAILY
COMMUNITY
Start: 2024-12-20

## 2024-12-24 RX ORDER — ACETAMINOPHEN 160 MG
2000 TABLET,DISINTEGRATING ORAL DAILY
COMMUNITY

## 2024-12-24 RX ORDER — GLIPIZIDE 10 MG/1
10 TABLET ORAL
COMMUNITY
Start: 2024-11-21

## 2024-12-24 RX ORDER — DICLOFENAC SODIUM 1 MG/ML
SOLUTION/ DROPS OPHTHALMIC
COMMUNITY
Start: 2024-12-18

## 2024-12-24 NOTE — LETTER
December 24, 2024     Andre Rojas MD  [address]  [address]  [place] [id number]    Patient: Lemuel Borrego   YOB: 1945   Date of Visit: 12/24/2024     Dear Andre Rojas MD:       Thank you for referring Lemuel Borrego to me for evaluation. Below are the relevant portions of my assessment and plan of care.    If you have questions, please do not hesitate to call me. I look forward to following Lemuel along with you.         Sincerely,        Odilon Beavers MD        CC: No Recipients    Odilon Beavers MD  12/24/24 1217  Sign when Signing Visit  Subjective  Patient ID: Lemuel Borrego is a 79 y.o. male is here today for follow-up.    Today patient states pain on Right side of body.    I been following this patient for cervical stenosis for quite some time.  But the last time I saw him was 2 years ago.  He has been seen by the nurse practitioners and by Dr. Martinez more recently because of my medical absence.  He has had known cervical stenosis for several years and it presented mainly with numbness and tingling which at the time I felt did not require surgical intervention but he has developed some weakness in his right arm in particular and some gait ataxia and progressive weakness in both legs.  He has some neurogenic claudication but he tells me that its his gait problem is balance problem, and weakness in his legs and right arm do bother him the most.  He was seen by Dr. Martinez surgery was recommended, which I agree with.  The patient wanted me to do it and I spoke to him about it.  Given the progression of the cervical myelopathy I think a laminoplasty at C2-C3, C3-C4, C4-C5, and C5-C6 with the unhinged instrumented side being the right side which is his worst side clinically.  I think this will address the rest of his myelopathy and hopefully return of some balance and strength.  He says the pain in his legs is the main issue so I think I would hold off on doing  a lumbar decompression for now and see how he does after the laminoplasty and make a decision later whether or not he can be managed with conservative treatment such as injections or with another surgery from L2-L5 consisting of a decompression without fusion.  He is a type II diabetic.  He does not have a history of any cardiac ischemic disease but will get cardiac clearance beforehand.  He is not on any blood thinners.  Will try and proceed with laminoplasty as described below soon and then I will reassess him in the postoperative period in regards to the lumbar spine.    History of Present Illness    The following portions of the patient's history were reviewed and updated as appropriate: allergies, current medications, past family history, past medical history, past social history, past surgical history, and problem list.    Review of Systems   All other systems reviewed and are negative.          Objective    Vitals:    12/24/24 1109   BP: 120/68   Pulse: 70   SpO2: 97%   PainSc:   7     There is no height or weight on file to calculate BMI.      Physical Exam  Constitutional:       General: He is awake.      Appearance: He is well-developed.   HENT:      Head: Normocephalic and atraumatic.   Eyes:      General: Lids are normal.      Extraocular Movements: Extraocular movements intact.      Conjunctiva/sclera: Conjunctivae normal.      Pupils: Pupils are equal, round, and reactive to light.   Neck:      Vascular: No carotid bruit.   Neurological:      Mental Status: He is alert.      Coordination: Coordination is intact.      Deep Tendon Reflexes:      Reflex Scores:       Tricep reflexes are 2+ on the right side and 2+ on the left side.       Bicep reflexes are 2+ on the right side and 2+ on the left side.       Brachioradialis reflexes are 2+ on the right side and 2+ on the left side.       Patellar reflexes are 2+ on the right side and 2+ on the left side.       Achilles reflexes are 2+ on the right side and  2+ on the left side.  Psychiatric:         Speech: Speech normal.       Neurological Exam  Mental Status  Awake and alert. Oriented only to person, place, time and situation. Recent and remote memory are intact. Speech is normal. Language is fluent with no aphasia. Attention and concentration are normal. Fund of knowledge is appropriate for level of education.    Cranial Nerves  CN II: Visual acuity is normal. Visual fields full to confrontation.  CN III, IV, VI: Extraocular movements intact bilaterally. Normal lids and orbits bilaterally. Pupils equal round and reactive to light bilaterally.  CN V: Facial sensation is normal.  CN VII: Full and symmetric facial movement.  CN IX, X: Palate elevates symmetrically. Normal gag reflex.  CN XI: Shoulder shrug strength is normal.  CN XII: Tongue midline without atrophy or fasciculations.    Motor  Normal muscle bulk throughout. Normal muscle tone.                                               Right                     Left  Rhomboids                            5                          5  Infraspinatus                          5                          5  Supraspinatus                       5                          5  Deltoid                                   4                          5   Biceps                                   4                          5  Brachioradialis                      4                          5   Triceps                                  4                          5   Pronator                                5                          5   Supinator                              5                           5   Wrist flexor                            5                          5   Wrist extensor                       5                          5   Finger flexor                          5                          5   Finger extensor                     5                          5   Interossei                              5                           5   Abductor pollicis brevis         5                          5   Flexor pollicis brevis             5                          5   Opponens pollicis                 5                          5  Extensor digitorum               5                          5  Abductor digiti minimi           5                          5   Abdominal                            5                          5  Glutei                                    5                          5  Hip abductor                         5                          5  Hip adductor                         5                          5   Iliopsoas                               5                          5   Quadriceps                           5                          5   Hamstring                             5                          5   Gastrocnemius                     5                           5   Anterior tibialis                      5                          5   Posterior tibialis                    5                          5   Peroneal                               5                          5  Ankle dorsiflexor                   5                          5  Ankle plantar flexor              5                           5  Extensor hallucis longus      5                           5    Sensory  Light touch is normal in upper and lower extremities. Proprioception is normal in upper and lower extremities.     Reflexes                                            Right                      Left  Brachioradialis                    2+                         2+  Biceps                                 2+                         2+  Triceps                                2+                         2+  Finger flex                           2+                         2+  Hamstring                            2+                         2+  Patellar                                2+                         2+  Achilles                                2+                          2+    Coordination    Finger-to-nose, rapid alternating movements and heel-to-shin normal bilaterally without dysmetria.    Gait  Casual gait: Wide stance. Reduced stride length. Ataxic gait. Toe walking abnormality: Heel walking abnormality: Tandem gait abnormality:          Assessment & Plan  Independent Review of Radiographic Studies:      I personally reviewed the images from the following studies.    Cervical MRI done on 4/15/2024 shows severe circumferential stenosis at C2-C3, C3-C4 and C4-C5 and a bit at C5-C6.  There is increased cord signal changes at C2-C3 C3-C4 and a bit at C4-C5.  The lumbar MRI done on 11/20/2024 does show severe spinal stenosis without spondylolisthesis at L2-L3, L3-L4 and L4-L5.  Agree with the report.    Medical Decision Making:      I described and recommended a posterior cervical laminoplasty from C2-C3, C3-C4, C4-C5, and C5-C6.  The hardware would be placed on the right side since that is the mainly symptomatic side.  The goal of surgery is arrest of progressive myelopathy and hopefully return of motor strength and balance.  He knows that this may take a while since this has been a progressive problem.  Will assess how much neurogenic claudication pain he has and whether or not he actually needs a lumbar surgery at some point later.  The risks include, but are not limited to, infection, hemorrhage requiring transfusion or reoperation, CSF leak requiring reoperation, incomplete relief of symptoms, potential need for additional surgeries in the future, hardware failure, stroke, paralysis, coma, and death.  He agrees to proceed.    Will get cardiac clearance in preparation for this.  I told him he will likely need subacute rehab.  He will be seen by the nurse practitioners initially.  I like to see him at the 3 to 4-month milagros to see how he is doing in terms of his myelopathy but also make judgment that point whether or not he needs to have something done surgically  for his lumbar region.      Diagnoses and all orders for this visit:    1. Cervical spondylosis with myelopathy (Primary)  -     Ambulatory Referral to Cardiology  -     Case Request; Standing  -     Type & Screen; Future  -     ceFAZolin (ANCEF) 2 g in sodium chloride 0.9 % 100 mL IVPB  -     Case Request    2. Gait instability  -     Ambulatory Referral to Cardiology  -     Case Request; Standing  -     Type & Screen; Future  -     ceFAZolin (ANCEF) 2 g in sodium chloride 0.9 % 100 mL IVPB  -     Case Request    3. Spinal stenosis of lumbar region with neurogenic claudication  -     Ambulatory Referral to Cardiology    4. Imbalance  -     Ambulatory Referral to Cardiology  -     Case Request; Standing  -     Type & Screen; Future  -     ceFAZolin (ANCEF) 2 g in sodium chloride 0.9 % 100 mL IVPB  -     Case Request    5. Spinal stenosis in cervical region  -     Ambulatory Referral to Cardiology  -     Case Request; Standing  -     Type & Screen; Future  -     ceFAZolin (ANCEF) 2 g in sodium chloride 0.9 % 100 mL IVPB  -     Case Request    Other orders  -     Follow Anesthesia Guidelines / Protocol; Future  -     Follow Anesthesia Guidelines / Protocol; Standing  -     SCD (Sequential Compression Device) - To Be Placed on Patient in Pre-Op; Standing  -     Verify / Perform Chlorhexidine Skin Prep; Standing  -     Provide Patient With Instructions on NPO Status; Future  -     Provide Chlorhexidine Skin Prep Wipes and Instructions; Future      Return for 2 weeks after surgery with an APC..

## 2024-12-24 NOTE — PROGRESS NOTES
Subjective   Patient ID: Lemuel Borrego is a 79 y.o. male is here today for follow-up.    Today patient states pain on Right side of body.    I been following this patient for cervical stenosis for quite some time.  But the last time I saw him was 2 years ago.  He has been seen by the nurse practitioners and by Dr. Martinez more recently because of my medical absence.  He has had known cervical stenosis for several years and it presented mainly with numbness and tingling which at the time I felt did not require surgical intervention but he has developed some weakness in his right arm in particular and some gait ataxia and progressive weakness in both legs.  He has some neurogenic claudication but he tells me that its his gait problem is balance problem, and weakness in his legs and right arm do bother him the most.  He was seen by Dr. Martinez surgery was recommended, which I agree with.  The patient wanted me to do it and I spoke to him about it.  Given the progression of the cervical myelopathy I think a laminoplasty at C2-C3, C3-C4, C4-C5, and C5-C6 with the unhinged instrumented side being the right side which is his worst side clinically.  I think this will address the rest of his myelopathy and hopefully return of some balance and strength.  He says the pain in his legs is the main issue so I think I would hold off on doing a lumbar decompression for now and see how he does after the laminoplasty and make a decision later whether or not he can be managed with conservative treatment such as injections or with another surgery from L2-L5 consisting of a decompression without fusion.  He is a type II diabetic.  He does not have a history of any cardiac ischemic disease but will get cardiac clearance beforehand.  He is not on any blood thinners.  Will try and proceed with laminoplasty as described below soon and then I will reassess him in the postoperative period in regards to the lumbar spine.    History of Present  Illness    The following portions of the patient's history were reviewed and updated as appropriate: allergies, current medications, past family history, past medical history, past social history, past surgical history, and problem list.    Review of Systems   All other systems reviewed and are negative.          Objective     Vitals:    12/24/24 1109   BP: 120/68   Pulse: 70   SpO2: 97%   PainSc:   7     There is no height or weight on file to calculate BMI.      Physical Exam  Constitutional:       General: He is awake.      Appearance: He is well-developed.   HENT:      Head: Normocephalic and atraumatic.   Eyes:      General: Lids are normal.      Extraocular Movements: Extraocular movements intact.      Conjunctiva/sclera: Conjunctivae normal.      Pupils: Pupils are equal, round, and reactive to light.   Neck:      Vascular: No carotid bruit.   Neurological:      Mental Status: He is alert.      Coordination: Coordination is intact.      Deep Tendon Reflexes:      Reflex Scores:       Tricep reflexes are 2+ on the right side and 2+ on the left side.       Bicep reflexes are 2+ on the right side and 2+ on the left side.       Brachioradialis reflexes are 2+ on the right side and 2+ on the left side.       Patellar reflexes are 2+ on the right side and 2+ on the left side.       Achilles reflexes are 2+ on the right side and 2+ on the left side.  Psychiatric:         Speech: Speech normal.       Neurological Exam  Mental Status  Awake and alert. Oriented only to person, place, time and situation. Recent and remote memory are intact. Speech is normal. Language is fluent with no aphasia. Attention and concentration are normal. Fund of knowledge is appropriate for level of education.    Cranial Nerves  CN II: Visual acuity is normal. Visual fields full to confrontation.  CN III, IV, VI: Extraocular movements intact bilaterally. Normal lids and orbits bilaterally. Pupils equal round and reactive to light  bilaterally.  CN V: Facial sensation is normal.  CN VII: Full and symmetric facial movement.  CN IX, X: Palate elevates symmetrically. Normal gag reflex.  CN XI: Shoulder shrug strength is normal.  CN XII: Tongue midline without atrophy or fasciculations.    Motor  Normal muscle bulk throughout. Normal muscle tone.                                               Right                     Left  Rhomboids                            5                          5  Infraspinatus                          5                          5  Supraspinatus                       5                          5  Deltoid                                   4                          5   Biceps                                   4                          5  Brachioradialis                      4                          5   Triceps                                  4                          5   Pronator                                5                          5   Supinator                              5                           5   Wrist flexor                            5                          5   Wrist extensor                       5                          5   Finger flexor                          5                          5   Finger extensor                     5                          5   Interossei                              5                          5   Abductor pollicis brevis         5                          5   Flexor pollicis brevis             5                          5   Opponens pollicis                 5                          5  Extensor digitorum               5                          5  Abductor digiti minimi           5                          5   Abdominal                            5                          5  Glutei                                    5                          5  Hip abductor                         5                          5  Hip adductor                         5                           5   Iliopsoas                               5                          5   Quadriceps                           5                          5   Hamstring                             5                          5   Gastrocnemius                     5                           5   Anterior tibialis                      5                          5   Posterior tibialis                    5                          5   Peroneal                               5                          5  Ankle dorsiflexor                   5                          5  Ankle plantar flexor              5                           5  Extensor hallucis longus      5                           5    Sensory  Light touch is normal in upper and lower extremities. Proprioception is normal in upper and lower extremities.     Reflexes                                            Right                      Left  Brachioradialis                    2+                         2+  Biceps                                 2+                         2+  Triceps                                2+                         2+  Finger flex                           2+                         2+  Hamstring                            2+                         2+  Patellar                                2+                         2+  Achilles                                2+                         2+    Coordination    Finger-to-nose, rapid alternating movements and heel-to-shin normal bilaterally without dysmetria.    Gait  Casual gait: Wide stance. Reduced stride length. Ataxic gait. Toe walking abnormality: Heel walking abnormality: Tandem gait abnormality:          Assessment & Plan   Independent Review of Radiographic Studies:      I personally reviewed the images from the following studies.    Cervical MRI done on 4/15/2024 shows severe circumferential stenosis at C2-C3, C3-C4 and C4-C5 and a bit at C5-C6.  There is increased cord signal changes at  C2-C3 C3-C4 and a bit at C4-C5.  The lumbar MRI done on 11/20/2024 does show severe spinal stenosis without spondylolisthesis at L2-L3, L3-L4 and L4-L5.  Agree with the report.    Medical Decision Making:      I described and recommended a posterior cervical laminoplasty from C2-C3, C3-C4, C4-C5, and C5-C6.  The hardware would be placed on the right side since that is the mainly symptomatic side.  The goal of surgery is arrest of progressive myelopathy and hopefully return of motor strength and balance.  He knows that this may take a while since this has been a progressive problem.  Will assess how much neurogenic claudication pain he has and whether or not he actually needs a lumbar surgery at some point later.  The risks include, but are not limited to, infection, hemorrhage requiring transfusion or reoperation, CSF leak requiring reoperation, incomplete relief of symptoms, potential need for additional surgeries in the future, hardware failure, stroke, paralysis, coma, and death.  He agrees to proceed.    Will get cardiac clearance in preparation for this.  I told him he will likely need subacute rehab.  He will be seen by the nurse practitioners initially.  I like to see him at the 3 to 4-month milagros to see how he is doing in terms of his myelopathy but also make judgment that point whether or not he needs to have something done surgically for his lumbar region.      Diagnoses and all orders for this visit:    1. Cervical spondylosis with myelopathy (Primary)  -     Ambulatory Referral to Cardiology  -     Case Request; Standing  -     Type & Screen; Future  -     ceFAZolin (ANCEF) 2 g in sodium chloride 0.9 % 100 mL IVPB  -     Case Request    2. Gait instability  -     Ambulatory Referral to Cardiology  -     Case Request; Standing  -     Type & Screen; Future  -     ceFAZolin (ANCEF) 2 g in sodium chloride 0.9 % 100 mL IVPB  -     Case Request    3. Spinal stenosis of lumbar region with neurogenic  claudication  -     Ambulatory Referral to Cardiology    4. Imbalance  -     Ambulatory Referral to Cardiology  -     Case Request; Standing  -     Type & Screen; Future  -     ceFAZolin (ANCEF) 2 g in sodium chloride 0.9 % 100 mL IVPB  -     Case Request    5. Spinal stenosis in cervical region  -     Ambulatory Referral to Cardiology  -     Case Request; Standing  -     Type & Screen; Future  -     ceFAZolin (ANCEF) 2 g in sodium chloride 0.9 % 100 mL IVPB  -     Case Request    Other orders  -     Follow Anesthesia Guidelines / Protocol; Future  -     Follow Anesthesia Guidelines / Protocol; Standing  -     SCD (Sequential Compression Device) - To Be Placed on Patient in Pre-Op; Standing  -     Verify / Perform Chlorhexidine Skin Prep; Standing  -     Provide Patient With Instructions on NPO Status; Future  -     Provide Chlorhexidine Skin Prep Wipes and Instructions; Future      Return for 2 weeks after surgery with an APC..

## 2024-12-27 ENCOUNTER — TELEPHONE (OUTPATIENT)
Dept: NEUROSURGERY | Facility: CLINIC | Age: 79
End: 2024-12-27
Payer: MEDICARE

## 2024-12-27 NOTE — TELEPHONE ENCOUNTER
Patient's daughter called requesting refill for Gabapentin 600 mg. She said patient is out an pharmacy and pharmacy said he picked up and Rx on 12/12/24. Daughter does not know where medication is. She said she puts his meds in medication container for him to take so she does not know how he could be out it. Patient lives alone.    She would like to know if this is something that patient needs to take regularly? She is not sure if patient  will have any to take until his next refill is due in Jan. Is this ok? Should he take Tylenol in place of the Gabapentin?     I called pharmacy, they also said Gabapentin 600 mg #60 was picked up from the pharmacy on 12/12/24. Patient does have Neuropathy and family is concerned since he does not have the Gabapentin.       (Call sister Marietta [phone] after response back from  or JANELL)

## 2024-12-27 NOTE — TELEPHONE ENCOUNTER
I spoke to patients grayson Mcmanus let her know what JANELL Cabrera had said. She states she understands, they will try Tylenol if needed.

## 2024-12-30 NOTE — TELEPHONE ENCOUNTER
He is now a Dr. Beavers patient due to he does not want to be seen by a younger doctor.  He would like older more experienced doctor.   Pierce Beavers has already seen patient placed a case request for surgery. I will cancel his current upcoming appmt with Dr. Martinez.

## 2025-01-20 ENCOUNTER — OFFICE VISIT (OUTPATIENT)
Dept: CARDIOLOGY | Facility: CLINIC | Age: 80
End: 2025-01-20
Payer: MEDICARE

## 2025-01-20 VITALS
HEART RATE: 79 BPM | OXYGEN SATURATION: 98 % | HEIGHT: 69 IN | BODY MASS INDEX: 29.47 KG/M2 | SYSTOLIC BLOOD PRESSURE: 110 MMHG | WEIGHT: 199 LBS | DIASTOLIC BLOOD PRESSURE: 72 MMHG

## 2025-01-20 DIAGNOSIS — R06.02 SOB (SHORTNESS OF BREATH): ICD-10-CM

## 2025-01-20 DIAGNOSIS — Z01.810 PREOP CARDIOVASCULAR EXAM: ICD-10-CM

## 2025-01-20 DIAGNOSIS — R94.31 ABNORMAL ECG: Primary | ICD-10-CM

## 2025-01-20 PROCEDURE — 93000 ELECTROCARDIOGRAM COMPLETE: CPT | Performed by: INTERNAL MEDICINE

## 2025-01-20 PROCEDURE — 99204 OFFICE O/P NEW MOD 45 MIN: CPT | Performed by: INTERNAL MEDICINE

## 2025-01-20 NOTE — PROGRESS NOTES
Subjective:     Encounter Date:01/20/25      Patient ID: Lemuel Borrego is a 79 y.o. male.    Chief Complaint:  History of Present Illness    Dear Dr. Beavers,    I had the pleasure of seeing this patient in the office today for initial evaluation and consultation.  I appreciate that you sent him in to see us.  They come in today to be seen for assessment of cardiac risk prior to surgery.    Patient has cervical spondylosis and there are potential plans for posterior cervical laminoplasty.  This is not yet scheduled.    Patient's been seen by cardiology in the past.  An EKG in the past that was felt to be consistent with a right bundle branch block as well as inferior Q waves.  He was seen at [facility].  He had a stress test performed there that was normal with no evidence of prior infarct.  He was last seen there in 2017.    He denies any chest pain or chest discomfort.  He has been very fatigued.  He says he gets short of breath easily.  He says this has been the case ever since he had COVID previously.  He said he was very ill when he was hospitalized at the time.  I have looked back and there was no cardiac evaluation at that time.    The following portions of the patient's history were reviewed and updated as appropriate: allergies, current medications, past family history, past medical history, past social history, past surgical history and problem list.      ECG 12 Lead    Date/Time: 1/20/2025 3:14 PM  Performed by: Adonay Ortiz III, MD    Authorized by: Adonay Ortiz III, MD  Comparison: compared with previous ECG   Similar to previous ECG  Rhythm: sinus rhythm  Rate: normal  Conduction: right bundle branch block  Q waves: III and aVF    ST Segments: ST segments normal  T Waves: T waves normal  QRS axis: normal  Other: no other findings    Clinical impression: non-specific ECG             Objective:     Vitals:    01/20/25 1351   BP: 110/72   BP Location: Right arm   Patient Position: Sitting   Cuff  "Size: Adult   Pulse: 79   SpO2: 98%   Weight: 90.3 kg (199 lb)   Height: 175.3 cm (69\")     Body mass index is 29.39 kg/m².      Vitals reviewed.   Constitutional:       General: Not in acute distress.     Appearance: Well-developed. Not diaphoretic.   Eyes:      General:         Right eye: No discharge.         Left eye: No discharge.      Conjunctiva/sclera: Conjunctivae normal.   HENT:      Head: Normocephalic and atraumatic.      Nose: Nose normal.   Neck:      Thyroid: No thyromegaly.      Trachea: No tracheal deviation.   Pulmonary:      Effort: Pulmonary effort is normal. No respiratory distress.      Breath sounds: Normal breath sounds. No stridor.   Chest:      Chest wall: Not tender to palpatation.   Cardiovascular:      Normal rate. Regular rhythm.      Murmurs: There is no murmur.      . No S3 gallop. No click. No rub.   Pulses:     Intact distal pulses.   Edema:     Peripheral edema absent.   Abdominal:      General: Bowel sounds are normal. There is no distension.      Palpations: Abdomen is soft. There is no abdominal mass.   Musculoskeletal: Normal range of motion.         General: No tenderness or deformity.      Cervical back: Normal range of motion and neck supple. Skin:     General: Skin is warm and dry.      Findings: No erythema or rash.   Neurological:      Mental Status: Alert.   Psychiatric:         Attention and Perception: Attention normal.         Data and records reviewed:     Lab Results   Component Value Date    BUN 22 01/11/2023    CREATININE 0.93 01/11/2023     01/11/2023    K 3.6 12/03/2023     01/11/2023    CALCIUM 9.9 01/11/2023    PROTEINTOT 7.3 01/11/2023    ALBUMIN 4.6 01/11/2023    ALT 18 01/11/2023    AST 16 01/11/2023    ALKPHOS 64 01/11/2023    BILITOT 0.6 01/11/2023    GLOB 2.7 01/11/2023    BCR 23.5 01/11/2023    ANIONGAP 8 01/11/2023     No results found for: \"CHOL\"  Lab Results   Component Value Date    TRIG 84 01/06/2022    TRIG 126 09/01/2020    TRIG 100 " "04/07/2020     Lab Results   Component Value Date    HDL 59 (L) 01/06/2022    HDL 48 09/01/2020    HDL 48 04/07/2020     Lab Results   Component Value Date    LDL 87 01/06/2022    LDL 84 09/01/2020    LDL 75 04/07/2020     Lab Results   Component Value Date    VLDL 17 01/06/2022    VLDL 25 09/01/2020    VLDL 20 04/07/2020     No results found for: \"LDLHDL\"    CT Cervical Spine Without Contrast    Result Date: 11/21/2024  There is no evidence of ossification of posterior longitudinal ligament. Multilevel degenerative disease involving cervical spine is noted as described in detail above with no evidence of a focal disc herniation. The cord is not able to be adequately assessed on the CT examination. Further evaluation could be performed with a MRI examination of the cervical spine as indicated.   Radiation dose reduction techniques were utilized, including automated exposure control and exposure modulation based on body size.   This report was finalized on 11/21/2024 10:41 AM by Dr. Andre Box M.D on Workstation: [id number]      MRI Lumbar Spine Without Contrast    Result Date: 11/21/2024  There is no evidence of a focal herniation. 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 This includes severe canal stenosis at L4-L5, moderate to severe canal stenosis at L3-L4 and mild to moderate canal stenosis at L2-L3. See above.  This report was finalized on 11/21/2024 8:57 AM by Dr. Andre Box M.D on Workstation: [id number]             Assessment:          Diagnosis Plan   1. Abnormal ECG  Adult Transthoracic Echo Complete W/ Cont if Necessary Per Protocol    ECG 12 Lead      2. Preop cardiovascular exam  Adult Transthoracic Echo Complete W/ Cont if Necessary Per Protocol    ECG 12 Lead      3. SOB (shortness of breath)  ECG 12 Lead             Plan:       1.  Preoperative cardiac assessment-patient reports dyspnea.  " While the patient's calculated periprocedural risk is low with an estimated risk probability for perioperative myocardial infarction or cardiac arrest is 0.1% (Ashley), given his dyspnea and it tracking back all the way to when he had severe COVID that he was hospitalized for I think it would be best to proceed with an echocardiogram first.  2.  Dyspnea with results on the echocardiogram  3.  Abnormal EKG-his EKG is somewhat of the way it was described when he was at [facility] but we have called over to get a copy of that EKG to compare.    Thank you very much for allowing us to participate in the care of this pleasant patient.  Please don't hesitate to call if I can be of assistance in any way.      Current Outpatient Medications:     aspirin 81 MG chewable tablet, Chew 1 tablet Daily., Disp: , Rfl:     Cholecalciferol (Vitamin D3) 50 MCG (2000 UT) capsule, Take 1 capsule by mouth Daily., Disp: , Rfl:     cyclobenzaprine (FLEXERIL) 5 MG tablet, Take 1 tablet by mouth 3 (Three) Times a Day As Needed for Muscle Spasms., Disp: 30 tablet, Rfl: 0    diclofenac (VOLTAREN) 0.1 % ophthalmic solution, INSTILL 1 DROP TWICE DAILY BOTH EYES, Disp: , Rfl:     dorzolamide-timolol (COSOPT) 2-0.5 % ophthalmic solution, Administer 1 drop to both eyes 2 (Two) Times a Day., Disp: , Rfl:     ezetimibe (ZETIA) 10 MG tablet, TAKE 1 TABLET BY MOUTH DAILY, Disp: , Rfl:     gabapentin (NEURONTIN) 600 MG tablet, TAKE 1 TABLET BY MOUTH TWICE DAILY, Disp: 60 tablet, Rfl: 5    gabapentin (NEURONTIN) 600 MG tablet, Take 1 tablet by mouth 2 (Two) Times a Day., Disp: 60 tablet, Rfl: 3    glipizide (GLUCOTROL) 10 MG tablet, Take 1 tablet by mouth., Disp: , Rfl:     hydrALAZINE (APRESOLINE) 100 MG tablet, TAKE 1 TABLET BY MOUTH THREE TIMES DAILY, Disp: , Rfl:     Insulin Glargine (LANTUS SOLOSTAR) 100 UNIT/ML injection pen, INJECT 12 UNITS UNDER THE SKIN DAILY, Disp: , Rfl:     Insulin Pen Needle 31G X 5 MM misc, INJECT 1 PEN NEEDLE FOR INJECTIONS  FOUR TIMES A DAY, Disp: , Rfl:     latanoprost (XALATAN) 0.005 % ophthalmic solution, INSTILL ONE GTT IN OU QHS, Disp: , Rfl:     metFORMIN (GLUCOPHAGE) 850 MG tablet, TAKE 1 TABLET BY MOUTH TWO TIMES A DAY WITH MEALS, Disp: , Rfl:     Misc. Devices (Cervical Traction) kit, Use 1 Application Daily. Cervical Traction Kit- begin with 4-6 pounds for 20 minutes daily; and increase slowly by 2 pounds every 3 days to max of 10-12 pounds, Disp: 1 each, Rfl: 0    propranolol (INDERAL) 40 MG tablet, TAKE 1 TABLET BY MOUTH THREE TIMES DAILY, Disp: , Rfl:     valsartan-hydrochlorothiazide (DIOVAN-HCT) 320-25 MG per tablet, TAKE 1 TABLET BY MOUTH DAILY, Disp: , Rfl:     vitamin C (ASCORBIC ACID) 250 MG tablet, Take 1 tablet by mouth Daily., Disp: , Rfl:          No follow-ups on file.

## 2025-01-29 ENCOUNTER — HOSPITAL ENCOUNTER (OUTPATIENT)
Dept: CARDIOLOGY | Facility: HOSPITAL | Age: 80
Discharge: HOME | End: 2025-01-29
Admitting: INTERNAL MEDICINE
Payer: MEDICARE

## 2025-01-29 VITALS
HEART RATE: 85 BPM | HEIGHT: 69 IN | OXYGEN SATURATION: 98 % | DIASTOLIC BLOOD PRESSURE: 60 MMHG | SYSTOLIC BLOOD PRESSURE: 110 MMHG | BODY MASS INDEX: 29.47 KG/M2 | WEIGHT: 199 LBS

## 2025-01-29 DIAGNOSIS — R94.31 ABNORMAL ECG: ICD-10-CM

## 2025-01-29 DIAGNOSIS — Z01.810 PREOP CARDIOVASCULAR EXAM: ICD-10-CM

## 2025-01-29 PROCEDURE — 93306 TTE W/DOPPLER COMPLETE: CPT

## 2025-01-29 PROCEDURE — 93356 MYOCRD STRAIN IMG SPCKL TRCK: CPT

## 2025-01-30 LAB
AORTIC ARCH: 3.6 CM
AORTIC DIMENSIONLESS INDEX: 1.01 (DI)
ASCENDING AORTA: 4.1 CM
AV HCM GRAD VALS: 88 MMHG
AV LVOT PEAK GRADIENT: 6 MMHG
AV MEAN PRESS GRAD SYS DOP V1V2: 5 MMHG
AV VMAX SYS DOP: 151 CM/SEC
BH CV ECHO LEFT VENTRICLE GLOBAL LONGITUDINAL STRAIN: -25.1 %
BH CV ECHO MEAS - ACS: 2.01 CM
BH CV ECHO MEAS - AO MAX PG: 9.1 MMHG
BH CV ECHO MEAS - AO ROOT AREA (BSA CORRECTED): 1.6 CM2
BH CV ECHO MEAS - AO ROOT DIAM: 3.3 CM
BH CV ECHO MEAS - AO V2 VTI: 29 CM
BH CV ECHO MEAS - AVA(I,D): 3.6 CM2
BH CV ECHO MEAS - EDV(CUBED): 40.9 ML
BH CV ECHO MEAS - EDV(MOD-SP2): 50 ML
BH CV ECHO MEAS - EDV(MOD-SP4): 47 ML
BH CV ECHO MEAS - EF(MOD-SP2): 76 %
BH CV ECHO MEAS - EF(MOD-SP4): 78.7 %
BH CV ECHO MEAS - ESV(CUBED): 6.9 ML
BH CV ECHO MEAS - ESV(MOD-SP2): 12 ML
BH CV ECHO MEAS - ESV(MOD-SP4): 10 ML
BH CV ECHO MEAS - FS: 44.7 %
BH CV ECHO MEAS - IVS/LVPW: 1.01 CM
BH CV ECHO MEAS - IVSD: 1.34 CM
BH CV ECHO MEAS - LA 3D VOL INDEX: 34
BH CV ECHO MEAS - LAT PEAK E' VEL: 7.3 CM/SEC
BH CV ECHO MEAS - LV DIASTOLIC VOL/BSA (35-75): 22.8 CM2
BH CV ECHO MEAS - LV MASS(C)D: 157.3 GRAMS
BH CV ECHO MEAS - LV MAX PG: 6.4 MMHG
BH CV ECHO MEAS - LV MEAN PG: 4.5 MMHG
BH CV ECHO MEAS - LV SYSTOLIC VOL/BSA (12-30): 4.9 CM2
BH CV ECHO MEAS - LV V1 MAX: 126.4 CM/SEC
BH CV ECHO MEAS - LV V1 VTI: 29.4 CM
BH CV ECHO MEAS - LVIDD: 3.4 CM
BH CV ECHO MEAS - LVIDS: 1.9 CM
BH CV ECHO MEAS - LVOT AREA: 3.5 CM2
BH CV ECHO MEAS - LVOT DIAM: 2.13 CM
BH CV ECHO MEAS - LVPWD: 1.33 CM
BH CV ECHO MEAS - MED PEAK E' VEL: 6.2 CM/SEC
BH CV ECHO MEAS - MV A DUR: 0.1 SEC
BH CV ECHO MEAS - MV A MAX VEL: 93.4 CM/SEC
BH CV ECHO MEAS - MV DEC SLOPE: 594.8 CM/SEC2
BH CV ECHO MEAS - MV DEC TIME: 0.14 SEC
BH CV ECHO MEAS - MV E MAX VEL: 50.7 CM/SEC
BH CV ECHO MEAS - MV E/A: 0.54
BH CV ECHO MEAS - MV MAX PG: 4.1 MMHG
BH CV ECHO MEAS - MV MEAN PG: 2.17 MMHG
BH CV ECHO MEAS - MV P1/2T: 38.7 MSEC
BH CV ECHO MEAS - MV V2 VTI: 21 CM
BH CV ECHO MEAS - MVA(P1/2T): 5.7 CM2
BH CV ECHO MEAS - MVA(VTI): 5 CM2
BH CV ECHO MEAS - PA ACC TIME: 0.08 SEC
BH CV ECHO MEAS - PA V2 MAX: 115.4 CM/SEC
BH CV ECHO MEAS - PULM A REVS DUR: 0.13 SEC
BH CV ECHO MEAS - PULM A REVS VEL: 37.4 CM/SEC
BH CV ECHO MEAS - PULM DIAS VEL: 38.6 CM/SEC
BH CV ECHO MEAS - PULM S/D: 1.17
BH CV ECHO MEAS - PULM SYS VEL: 45.2 CM/SEC
BH CV ECHO MEAS - QP/QS: 1.15
BH CV ECHO MEAS - RAP SYSTOLE: 3 MMHG
BH CV ECHO MEAS - RV MAX PG: 3.1 MMHG
BH CV ECHO MEAS - RV V1 MAX: 87.8 CM/SEC
BH CV ECHO MEAS - RV V1 VTI: 19.4 CM
BH CV ECHO MEAS - RVOT DIAM: 2.8 CM
BH CV ECHO MEAS - RVSP: 33.1 MMHG
BH CV ECHO MEAS - SV(LVOT): 104.5 ML
BH CV ECHO MEAS - SV(MOD-SP2): 38 ML
BH CV ECHO MEAS - SV(MOD-SP4): 37 ML
BH CV ECHO MEAS - SV(RVOT): 119.7 ML
BH CV ECHO MEAS - SVI(LVOT): 50.7 ML/M2
BH CV ECHO MEAS - SVI(MOD-SP2): 18.4 ML/M2
BH CV ECHO MEAS - SVI(MOD-SP4): 18 ML/M2
BH CV ECHO MEAS - TAPSE (>1.6): 1.94 CM
BH CV ECHO MEAS - TR MAX PG: 30.1 MMHG
BH CV ECHO MEAS - TR MAX VEL: 274.2 CM/SEC
BH CV ECHO MEASUREMENTS AVERAGE E/E' RATIO: 7.51
BH CV XLRA - RV BASE: 2.7 CM
BH CV XLRA - RV LENGTH: 7.9 CM
BH CV XLRA - RV MID: 2.5 CM
BH CV XLRA - TDI S': 16.2 CM/SEC
LEFT ATRIUM VOLUME INDEX: 29.2 ML/M2
LV EF 3D SEGMENTATION: 74 %
LV EF BIPLANE MOD: 77.7 %
SINUS: 3.2 CM
STJ: 2.5 CM

## 2025-02-03 ENCOUNTER — TELEPHONE (OUTPATIENT)
Dept: NEUROSURGERY | Facility: CLINIC | Age: 80
End: 2025-02-03
Payer: MEDICARE

## 2025-02-03 NOTE — TELEPHONE ENCOUNTER
Caller: Lemuel Borrego    Relationship: Self    Best call back number: [phone]    Who are you requesting to speak with (clinical staff, provider,  specific staff member): CLINICAL STAFF    What was the call regarding: PATIENT HAD CARDIAC TESTING COMPLETED AND IS REQUESTING GUIDANCE ON HOW TO MOVE FORWARD. PLEASE CALL PATIENT TO ADVISE.

## 2025-02-07 ENCOUNTER — TELEPHONE (OUTPATIENT)
Dept: CARDIOLOGY | Facility: CLINIC | Age: 80
End: 2025-02-07
Payer: MEDICARE

## 2025-02-07 NOTE — TELEPHONE ENCOUNTER
I called patient on his echocardiogram results.  No answer and left message for him to call back to the office.

## 2025-02-10 ENCOUNTER — TELEPHONE (OUTPATIENT)
Dept: NEUROSURGERY | Facility: CLINIC | Age: 80
End: 2025-02-10
Payer: MEDICARE

## 2025-02-10 ENCOUNTER — TELEPHONE (OUTPATIENT)
Dept: CARDIOLOGY | Facility: CLINIC | Age: 80
End: 2025-02-10

## 2025-02-10 NOTE — TELEPHONE ENCOUNTER
I DID SCHEDULE FOR MAY-    HE WOULD LIKE A CALL BACK FROM [facility] TO DISCUSS TESTING RESULTS-SEE ENCOUNTER FROM 2-3-25.

## 2025-02-10 NOTE — TELEPHONE ENCOUNTER
Called patient, LVM to call back and reschedule.  Please schedule first available.  Dr. MCMILLAN does not have anything sooner.  HUB can schedule

## 2025-02-10 NOTE — TELEPHONE ENCOUNTER
Patient is returning your call. He said if he does not answer at [phone] to please call him at [phone].     Nikole Hood RN  Triage [facility] [place]

## 2025-02-10 NOTE — TELEPHONE ENCOUNTER
Caller: Lemuel Borrego    Relationship to patient: Self    Best call back number: [phone]    Patient is needing: PT CALLING TO GET RESULTS OF HIS ECHO. HE WOULD LIKE FOR DR. JASSO TO SEE THESE RESULTS. CALL LATER TODAY

## 2025-02-10 NOTE — TELEPHONE ENCOUNTER
Hub staff attempted to follow warm transfer process and was unsuccessful     Caller: Lemuel Borrego    Relationship to patient: Self    Best call back number: [phone]    Patient is needing: PATIENT CALLED WANTING AN UPDATE ABOUT HIS TE FROM 02/03/25 - HAVE ADVISED PATIENT THAT THE OFFICE HAS TRIED TO CALL HIM ON 02/04/25    PLEASE CALL PATIENT WITH UPDATE  THANK YOU

## 2025-02-10 NOTE — TELEPHONE ENCOUNTER
Caller: Lemuel Borrego    Relationship: Self    What was the call regarding:  PT RETURNING CALL - WT TO HOMERO MUNOZ -

## 2025-02-12 ENCOUNTER — TELEPHONE (OUTPATIENT)
Dept: NEUROSURGERY | Facility: CLINIC | Age: 80
End: 2025-02-12

## 2025-02-12 NOTE — TELEPHONE ENCOUNTER
Caller: SUSAN     Relationship: DAUGHTER    Best call back number: [phone]    What is the medical concern/diagnosis: TREMORS-    What specialty or service is being requested: NEUROLOGY    What is the provider, practice or medical service name:     What is the office location:  NEUROLOGY    What is the office phone number: [phone] -FAX:[phone]    Any additional details: PATIENTS DAUGHTER CALLED AND STATES THEY HAD PATIENT IN THE ER AT [facility] 02/11/25-SHE STATES THAT THEY ARE NEEDING A REFERRAL TO NEUROLOGY AT [facility] [place], operated by [facility] FOR TREMORS-PATIENTS DAUGHTER ADVISED SHE HAS SPOKE WITH NEUROLOGY AND THEY STATED TO MAKE SURE TO PUT AIDEE BOLIVAR ON THE REFERRAL AS THAT WOULD BE SOONEST -SENDING TO OFFICE TO ADVISE OF CALL THANK YOU

## 2025-02-13 ENCOUNTER — TELEPHONE (OUTPATIENT)
Dept: CARDIOLOGY | Facility: CLINIC | Age: 80
End: 2025-02-13

## 2025-02-13 NOTE — TELEPHONE ENCOUNTER
I called Cardiology to inquired about clearance.  A message was sent to the doctor to upload whether patient is cleared for surgery

## 2025-02-13 NOTE — TELEPHONE ENCOUNTER
[facility]    Relationship to patient: NEUROLOGY    Best call back number: [phone]    Patient is needing: DR. ALMEIDA OFFICE NEEDS TO KNOW IF PT IS APPROVED TO HAVE SURGERY. PLEASE MESSAGE DR. ALMEIDA TO LET HIM KNOW WHAT NEEDS TO BE DONE TO GET PT CLEARED FOR SURGERY.

## 2025-02-14 NOTE — TELEPHONE ENCOUNTER
LOV on 1/20/25 with Dr. Ortiz:      Echo was performed on 1/29/2025 but it does not appear results have been discussed with patient yet:          Please let me know how you would like to proceed.    Thank you,  Betina GREEN RN  Triage Nurse [facility]  02/14/25  10:56 EST

## 2025-02-17 NOTE — TELEPHONE ENCOUNTER
Left voicemail for Lemuel Borrego requesting callback.    HUB: please transfer to Triage if patient returns call    Called  neurosurgery ([phone]) to review Dr. Garnett's message, however line just rang with no answer.     Thank you,  Betina GREEN RN  Triage Nurse [facility]  02/17/25   10:31 EST

## 2025-02-17 NOTE — TELEPHONE ENCOUNTER
Called and left a VM for patient to call the office back to get scheduled for a follow-up to discuss echo. Office number was provided.

## 2025-02-18 NOTE — TELEPHONE ENCOUNTER
Called Lemuel Borrego and reviewed Dr. Ortiz's message.  He verbalized understanding and requested call be placed to his daughter, Mila ([phone]), to schedule.    Called Nisreen and reviewed Dr. Ortiz' message with her.  She verbalized understanding.    Called Mila. She reports patient is currently hospitalized at [facility].  She states patient is very weak and will have to go to rehab after discharge.  Requested she call back when patient is discharged to schedule f/u with Dr. Ortiz to review echo results.  She verbalized understanding and stated she will do this.    Thank you,  Betina GREEN RN  Triage Nurse [facility]  02/18/25 10:57 EST

## 2025-02-19 ENCOUNTER — TELEPHONE (OUTPATIENT)
Dept: NEUROSURGERY | Facility: CLINIC | Age: 80
End: 2025-02-19
Payer: MEDICARE

## 2025-02-19 NOTE — TELEPHONE ENCOUNTER
Patient is headed to [facility].  Daughter will call if future appt can be made

## 2025-02-19 NOTE — TELEPHONE ENCOUNTER
----- Message from Odilon Beavers sent at 2/18/2025  3:45 PM EST -----  Regarding: RE: STEALTH FOR SURGERY  No Stealth. Do you know why he was admitted to [place]?  ----- Message -----  From: Nisreen Givens RegSched Rep  Sent: 2/18/2025  10:40 AM EST  To: Odilon Beavers MD  Subject: STEALTH FOR SURGERY                              I was scheduling this surgery and you wrote the order with stealth.  Scheduling ask me if you needed stealth because you didn't list what equip you needed for stealth.  I scheduled it for 3/14/25 ( a half day block).  If you need Stealth, then I will have to reschedule it to another day because there is not enough time on that day's block.

## 2025-03-10 ENCOUNTER — TELEPHONE (OUTPATIENT)
Dept: CARDIOLOGY | Facility: CLINIC | Age: 80
End: 2025-03-10
Payer: MEDICARE

## 2025-03-10 NOTE — TELEPHONE ENCOUNTER
Caller: DENNIS BIRD    Relationship: Emergency Contact    Best call back number: [phone]    Caller requesting test results: EX- WIFE    What test was performed: ECHO    When was the test performed: 1/29/25    Where was the test performed: [facility]    Additional notes: DENNIS BIRD IS CALLING TO GET ECHO TEST RESULTS FOR PT. SHE ASKED IF THE PT NEEDED TO COME IN TO GET RESULTS? PLEASE CALL DENNIS BIRD

## 2025-03-12 NOTE — TELEPHONE ENCOUNTER
Unable to speak with caller as she is not listed to alice given any verbal information in regards to patient.